# Patient Record
Sex: MALE | Race: WHITE | NOT HISPANIC OR LATINO | URBAN - METROPOLITAN AREA
[De-identification: names, ages, dates, MRNs, and addresses within clinical notes are randomized per-mention and may not be internally consistent; named-entity substitution may affect disease eponyms.]

---

## 2017-10-21 ENCOUNTER — INPATIENT (INPATIENT)
Facility: HOSPITAL | Age: 57
LOS: 3 days | Discharge: HOME | End: 2017-10-25
Attending: INTERNAL MEDICINE | Admitting: INTERNAL MEDICINE

## 2017-10-21 DIAGNOSIS — K74.60 UNSPECIFIED CIRRHOSIS OF LIVER: ICD-10-CM

## 2017-10-21 DIAGNOSIS — B18.2 CHRONIC VIRAL HEPATITIS C: ICD-10-CM

## 2017-10-30 DIAGNOSIS — B18.2 CHRONIC VIRAL HEPATITIS C: ICD-10-CM

## 2017-10-30 DIAGNOSIS — K70.31 ALCOHOLIC CIRRHOSIS OF LIVER WITH ASCITES: ICD-10-CM

## 2017-10-30 DIAGNOSIS — Z91.14 PATIENT'S OTHER NONCOMPLIANCE WITH MEDICATION REGIMEN: ICD-10-CM

## 2017-10-30 DIAGNOSIS — E87.5 HYPERKALEMIA: ICD-10-CM

## 2017-10-30 DIAGNOSIS — Z96.653 PRESENCE OF ARTIFICIAL KNEE JOINT, BILATERAL: ICD-10-CM

## 2017-10-30 DIAGNOSIS — E87.1 HYPO-OSMOLALITY AND HYPONATREMIA: ICD-10-CM

## 2017-10-30 DIAGNOSIS — F10.10 ALCOHOL ABUSE, UNCOMPLICATED: ICD-10-CM

## 2017-10-30 DIAGNOSIS — N49.2 INFLAMMATORY DISORDERS OF SCROTUM: ICD-10-CM

## 2017-10-30 DIAGNOSIS — F11.11 OPIOID ABUSE, IN REMISSION: ICD-10-CM

## 2017-10-30 DIAGNOSIS — Z88.0 ALLERGY STATUS TO PENICILLIN: ICD-10-CM

## 2017-10-30 DIAGNOSIS — E87.79 OTHER FLUID OVERLOAD: ICD-10-CM

## 2017-10-30 DIAGNOSIS — G89.4 CHRONIC PAIN SYNDROME: ICD-10-CM

## 2017-10-30 DIAGNOSIS — K70.40 ALCOHOLIC HEPATIC FAILURE WITHOUT COMA: ICD-10-CM

## 2018-12-02 ENCOUNTER — INPATIENT (INPATIENT)
Facility: HOSPITAL | Age: 58
LOS: 3 days | Discharge: ANOTHER HOSPITAL | End: 2018-12-06
Attending: INTERNAL MEDICINE | Admitting: INTERNAL MEDICINE
Payer: MEDICAID

## 2018-12-02 VITALS
DIASTOLIC BLOOD PRESSURE: 103 MMHG | OXYGEN SATURATION: 100 % | HEART RATE: 98 BPM | SYSTOLIC BLOOD PRESSURE: 119 MMHG | RESPIRATION RATE: 24 BRPM

## 2018-12-02 LAB
ALBUMIN SERPL ELPH-MCNC: 1.6 G/DL — LOW (ref 3.5–5.2)
ALLERGY+IMMUNOLOGY DIAG STUDY NOTE: SIGNIFICANT CHANGE UP
ALP SERPL-CCNC: 103 U/L — SIGNIFICANT CHANGE UP (ref 30–115)
ALT FLD-CCNC: 40 U/L — SIGNIFICANT CHANGE UP (ref 0–41)
AMMONIA BLD-MCNC: 88 UMOL/L — HIGH (ref 11–55)
ANION GAP SERPL CALC-SCNC: 14 MMOL/L — SIGNIFICANT CHANGE UP (ref 7–14)
ANION GAP SERPL CALC-SCNC: 19 MMOL/L — HIGH (ref 7–14)
APAP SERPL-MCNC: <5 UG/ML — LOW (ref 10–30)
AST SERPL-CCNC: 138 U/L — HIGH (ref 0–41)
BASE EXCESS BLDV CALC-SCNC: -4.3 MMOL/L — LOW (ref -2–2)
BASOPHILS # BLD AUTO: 0 K/UL — SIGNIFICANT CHANGE UP (ref 0–0.2)
BASOPHILS NFR BLD AUTO: 0 % — SIGNIFICANT CHANGE UP (ref 0–1)
BILIRUB SERPL-MCNC: 6.1 MG/DL — HIGH (ref 0.2–1.2)
BUN SERPL-MCNC: 33 MG/DL — HIGH (ref 10–20)
BUN SERPL-MCNC: 35 MG/DL — HIGH (ref 10–20)
CA-I SERPL-SCNC: 0.96 MMOL/L — LOW (ref 1.12–1.3)
CALCIUM SERPL-MCNC: 6.9 MG/DL — LOW (ref 8.5–10.1)
CALCIUM SERPL-MCNC: 7.4 MG/DL — LOW (ref 8.5–10.1)
CHLORIDE SERPL-SCNC: 84 MMOL/L — LOW (ref 98–110)
CHLORIDE SERPL-SCNC: 86 MMOL/L — LOW (ref 98–110)
CK MB CFR SERPL CALC: 26.1 NG/ML — HIGH (ref 0.6–6.3)
CK SERPL-CCNC: 1132 U/L — HIGH (ref 0–225)
CO2 SERPL-SCNC: 18 MMOL/L — SIGNIFICANT CHANGE UP (ref 17–32)
CO2 SERPL-SCNC: 20 MMOL/L — SIGNIFICANT CHANGE UP (ref 17–32)
CREAT SERPL-MCNC: 3.3 MG/DL — HIGH (ref 0.7–1.5)
CREAT SERPL-MCNC: 3.4 MG/DL — HIGH (ref 0.7–1.5)
EOSINOPHIL # BLD AUTO: 0.01 K/UL — SIGNIFICANT CHANGE UP (ref 0–0.7)
EOSINOPHIL NFR BLD AUTO: 0.1 % — SIGNIFICANT CHANGE UP (ref 0–8)
ETHANOL SERPL-MCNC: <10 MG/DL — HIGH
GAS PNL BLDV: 121 MMOL/L — LOW (ref 136–145)
GAS PNL BLDV: SIGNIFICANT CHANGE UP
GLUCOSE BLDC GLUCOMTR-MCNC: 100 MG/DL — HIGH (ref 70–99)
GLUCOSE SERPL-MCNC: 109 MG/DL — HIGH (ref 70–99)
GLUCOSE SERPL-MCNC: 45 MG/DL — CRITICAL LOW (ref 70–99)
HCO3 BLDV-SCNC: 21 MMOL/L — LOW (ref 22–29)
HCT VFR BLD CALC: 28.5 % — LOW (ref 42–52)
HCT VFR BLDA CALC: 45.4 % — HIGH (ref 34–44)
HGB BLD CALC-MCNC: 14.8 G/DL — SIGNIFICANT CHANGE UP (ref 14–18)
HGB BLD-MCNC: 9.5 G/DL — LOW (ref 14–18)
HOROWITZ INDEX BLDV+IHG-RTO: 21 — SIGNIFICANT CHANGE UP
IMM GRANULOCYTES NFR BLD AUTO: 0.9 % — HIGH (ref 0.1–0.3)
INR BLD: 2.93 RATIO — HIGH (ref 0.65–1.3)
LACTATE BLDV-MCNC: 5.4 MMOL/L — HIGH (ref 0.5–1.6)
LIDOCAIN IGE QN: 32 U/L — SIGNIFICANT CHANGE UP (ref 7–60)
LYMPHOCYTES # BLD AUTO: 0.54 K/UL — LOW (ref 1.2–3.4)
LYMPHOCYTES # BLD AUTO: 4.5 % — LOW (ref 20.5–51.1)
MAGNESIUM SERPL-MCNC: 1.8 MG/DL — SIGNIFICANT CHANGE UP (ref 1.8–2.4)
MCHC RBC-ENTMCNC: 31.9 PG — HIGH (ref 27–31)
MCHC RBC-ENTMCNC: 33.3 G/DL — SIGNIFICANT CHANGE UP (ref 32–37)
MCV RBC AUTO: 95.6 FL — HIGH (ref 80–94)
MONOCYTES # BLD AUTO: 0.97 K/UL — HIGH (ref 0.1–0.6)
MONOCYTES NFR BLD AUTO: 8 % — SIGNIFICANT CHANGE UP (ref 1.7–9.3)
NEUTROPHILS # BLD AUTO: 10.49 K/UL — HIGH (ref 1.4–6.5)
NEUTROPHILS NFR BLD AUTO: 86.5 % — HIGH (ref 42.2–75.2)
NRBC # BLD: 0 /100 WBCS — SIGNIFICANT CHANGE UP (ref 0–0)
PCO2 BLDV: 40 MMHG — LOW (ref 41–51)
PH BLDV: 7.33 — SIGNIFICANT CHANGE UP (ref 7.26–7.43)
PLATELET # BLD AUTO: 158 K/UL — SIGNIFICANT CHANGE UP (ref 130–400)
PO2 BLDV: 43 MMHG — HIGH (ref 20–40)
POTASSIUM BLDV-SCNC: 4.4 MMOL/L — SIGNIFICANT CHANGE UP (ref 3.3–5.6)
POTASSIUM SERPL-MCNC: 4.3 MMOL/L — SIGNIFICANT CHANGE UP (ref 3.5–5)
POTASSIUM SERPL-MCNC: 5 MMOL/L — SIGNIFICANT CHANGE UP (ref 3.5–5)
POTASSIUM SERPL-SCNC: 4.3 MMOL/L — SIGNIFICANT CHANGE UP (ref 3.5–5)
POTASSIUM SERPL-SCNC: 5 MMOL/L — SIGNIFICANT CHANGE UP (ref 3.5–5)
PROT SERPL-MCNC: 8.3 G/DL — HIGH (ref 6–8)
PROTHROM AB SERPL-ACNC: 32.3 SEC — HIGH (ref 9.95–12.87)
RBC # BLD: 2.98 M/UL — LOW (ref 4.7–6.1)
RBC # FLD: 17.2 % — HIGH (ref 11.5–14.5)
SALICYLATES SERPL-MCNC: <0.3 MG/DL — LOW (ref 4–30)
SAO2 % BLDV: 67 % — SIGNIFICANT CHANGE UP
SODIUM SERPL-SCNC: 120 MMOL/L — LOW (ref 135–146)
SODIUM SERPL-SCNC: 121 MMOL/L — LOW (ref 135–146)
TROPONIN T SERPL-MCNC: <0.01 NG/ML — SIGNIFICANT CHANGE UP
TYPE + AB SCN PNL BLD: SIGNIFICANT CHANGE UP
WBC # BLD: 12.12 K/UL — HIGH (ref 4.8–10.8)
WBC # FLD AUTO: 12.12 K/UL — HIGH (ref 4.8–10.8)

## 2018-12-02 RX ORDER — DEXTROSE 50 % IN WATER 50 %
100 SYRINGE (ML) INTRAVENOUS ONCE
Qty: 0 | Refills: 0 | Status: COMPLETED | OUTPATIENT
Start: 2018-12-02 | End: 2018-12-02

## 2018-12-02 RX ORDER — SODIUM CHLORIDE 9 MG/ML
1000 INJECTION INTRAMUSCULAR; INTRAVENOUS; SUBCUTANEOUS
Qty: 0 | Refills: 0 | Status: DISCONTINUED | OUTPATIENT
Start: 2018-12-02 | End: 2018-12-03

## 2018-12-02 RX ORDER — SODIUM CHLORIDE 9 MG/ML
1000 INJECTION INTRAMUSCULAR; INTRAVENOUS; SUBCUTANEOUS ONCE
Qty: 0 | Refills: 0 | Status: COMPLETED | OUTPATIENT
Start: 2018-12-02 | End: 2018-12-02

## 2018-12-02 RX ORDER — ALBUMIN HUMAN 25 %
50 VIAL (ML) INTRAVENOUS ONCE
Qty: 0 | Refills: 0 | Status: COMPLETED | OUTPATIENT
Start: 2018-12-02 | End: 2018-12-02

## 2018-12-02 RX ORDER — SODIUM CHLORIDE 9 MG/ML
1000 INJECTION, SOLUTION INTRAVENOUS
Qty: 0 | Refills: 0 | Status: DISCONTINUED | OUTPATIENT
Start: 2018-12-02 | End: 2018-12-03

## 2018-12-02 RX ORDER — PHYTONADIONE (VIT K1) 5 MG
2.5 TABLET ORAL ONCE
Qty: 0 | Refills: 0 | Status: COMPLETED | OUTPATIENT
Start: 2018-12-02 | End: 2018-12-02

## 2018-12-02 RX ORDER — SODIUM CHLORIDE 9 MG/ML
1000 INJECTION INTRAMUSCULAR; INTRAVENOUS; SUBCUTANEOUS
Qty: 0 | Refills: 0 | Status: DISCONTINUED | OUTPATIENT
Start: 2018-12-02 | End: 2018-12-02

## 2018-12-02 RX ORDER — ALBUMIN HUMAN 25 %
150 VIAL (ML) INTRAVENOUS ONCE
Qty: 0 | Refills: 0 | Status: DISCONTINUED | OUTPATIENT
Start: 2018-12-02 | End: 2018-12-02

## 2018-12-02 RX ORDER — CEFTRIAXONE 500 MG/1
2 INJECTION, POWDER, FOR SOLUTION INTRAMUSCULAR; INTRAVENOUS ONCE
Qty: 0 | Refills: 0 | Status: COMPLETED | OUTPATIENT
Start: 2018-12-02 | End: 2018-12-02

## 2018-12-02 RX ORDER — CEFTRIAXONE 500 MG/1
2 INJECTION, POWDER, FOR SOLUTION INTRAMUSCULAR; INTRAVENOUS EVERY 24 HOURS
Qty: 0 | Refills: 0 | Status: DISCONTINUED | OUTPATIENT
Start: 2018-12-03 | End: 2018-12-04

## 2018-12-02 RX ORDER — NOREPINEPHRINE BITARTRATE/D5W 8 MG/250ML
0.05 PLASTIC BAG, INJECTION (ML) INTRAVENOUS
Qty: 16 | Refills: 0 | Status: DISCONTINUED | OUTPATIENT
Start: 2018-12-02 | End: 2018-12-04

## 2018-12-02 RX ORDER — HYDROCORTISONE 20 MG
100 TABLET ORAL EVERY 6 HOURS
Qty: 0 | Refills: 0 | Status: DISCONTINUED | OUTPATIENT
Start: 2018-12-02 | End: 2018-12-04

## 2018-12-02 RX ORDER — PIPERACILLIN AND TAZOBACTAM 4; .5 G/20ML; G/20ML
4.5 INJECTION, POWDER, LYOPHILIZED, FOR SOLUTION INTRAVENOUS ONCE
Qty: 0 | Refills: 0 | Status: COMPLETED | OUTPATIENT
Start: 2018-12-02 | End: 2018-12-02

## 2018-12-02 RX ORDER — SODIUM CHLORIDE 9 MG/ML
1000 INJECTION, SOLUTION INTRAVENOUS
Qty: 0 | Refills: 0 | Status: DISCONTINUED | OUTPATIENT
Start: 2018-12-02 | End: 2018-12-02

## 2018-12-02 RX ORDER — DEXTROSE 50 % IN WATER 50 %
50 SYRINGE (ML) INTRAVENOUS
Qty: 0 | Refills: 0 | Status: DISCONTINUED | OUTPATIENT
Start: 2018-12-02 | End: 2018-12-06

## 2018-12-02 RX ORDER — ALBUMIN HUMAN 25 %
50 VIAL (ML) INTRAVENOUS ONCE
Qty: 0 | Refills: 0 | Status: DISCONTINUED | OUTPATIENT
Start: 2018-12-02 | End: 2018-12-02

## 2018-12-02 RX ORDER — CEFTRIAXONE 500 MG/1
INJECTION, POWDER, FOR SOLUTION INTRAMUSCULAR; INTRAVENOUS
Qty: 0 | Refills: 0 | Status: DISCONTINUED | OUTPATIENT
Start: 2018-12-02 | End: 2018-12-04

## 2018-12-02 RX ORDER — PHYTONADIONE (VIT K1) 5 MG
10 TABLET ORAL ONCE
Qty: 0 | Refills: 0 | Status: DISCONTINUED | OUTPATIENT
Start: 2018-12-02 | End: 2018-12-02

## 2018-12-02 RX ORDER — DEXTROSE 50 % IN WATER 50 %
50 SYRINGE (ML) INTRAVENOUS ONCE
Qty: 0 | Refills: 0 | Status: COMPLETED | OUTPATIENT
Start: 2018-12-02 | End: 2018-12-02

## 2018-12-02 RX ADMIN — Medication 50 MILLILITER(S): at 14:20

## 2018-12-02 RX ADMIN — CEFTRIAXONE 100 GRAM(S): 500 INJECTION, POWDER, FOR SOLUTION INTRAMUSCULAR; INTRAVENOUS at 19:47

## 2018-12-02 RX ADMIN — Medication 100 MILLIGRAM(S): at 22:11

## 2018-12-02 RX ADMIN — Medication 66.67 GRAM(S): at 23:45

## 2018-12-02 RX ADMIN — Medication 6.58 MICROGRAM(S)/KG/MIN: at 23:44

## 2018-12-02 RX ADMIN — SODIUM CHLORIDE 100 MILLILITER(S): 9 INJECTION, SOLUTION INTRAVENOUS at 22:25

## 2018-12-02 RX ADMIN — Medication 100 MILLILITER(S): at 12:15

## 2018-12-02 RX ADMIN — Medication 100.5 MILLIGRAM(S): at 23:13

## 2018-12-02 RX ADMIN — SODIUM CHLORIDE 100 MILLILITER(S): 9 INJECTION, SOLUTION INTRAVENOUS at 16:02

## 2018-12-02 RX ADMIN — PIPERACILLIN AND TAZOBACTAM 200 GRAM(S): 4; .5 INJECTION, POWDER, LYOPHILIZED, FOR SOLUTION INTRAVENOUS at 20:11

## 2018-12-02 RX ADMIN — CEFTRIAXONE 2 GRAM(S): 500 INJECTION, POWDER, FOR SOLUTION INTRAMUSCULAR; INTRAVENOUS at 20:15

## 2018-12-02 RX ADMIN — SODIUM CHLORIDE 1000 MILLILITER(S): 9 INJECTION INTRAMUSCULAR; INTRAVENOUS; SUBCUTANEOUS at 21:16

## 2018-12-02 RX ADMIN — SODIUM CHLORIDE 1000 MILLILITER(S): 9 INJECTION INTRAMUSCULAR; INTRAVENOUS; SUBCUTANEOUS at 12:20

## 2018-12-02 RX ADMIN — SODIUM CHLORIDE 100 MILLILITER(S): 9 INJECTION INTRAMUSCULAR; INTRAVENOUS; SUBCUTANEOUS at 14:21

## 2018-12-02 NOTE — ED PROVIDER NOTE - CRITICAL CARE PROVIDED
interpretation of diagnostic studies/consultation with other physicians/documentation/additional history taking/85/direct patient care (not related to procedure)/consult w/ pt's family directly relating to pts condition

## 2018-12-02 NOTE — ED PROVIDER NOTE - PROGRESS NOTE DETAILS
BG low, difficult access. access just obtained via US guided IV after 3 attempts. given 2 amps of D50. BG low, difficult access. access just obtained via US guided IV after 3 attempts. given 2 amps of D50. pt mentating. pending CT. ICU requests diagnostic paracentesis,. michael, brother -> [phone]; fidencio, friend -> [phone] due to elevated INR will not paracentese in ED. placed central line at bedside due to lack of access and pts clinical condition. paged Dr. cronin, accepted by ICU to unit. michael, brother -> [phone]; fidencio, friend -> [phone] have no information to contribute to history antolin- Patient presents with altered mental status unsure of eitology, was found by ems altered  with fecal material on b/l lower extremities, found hypotensive, hypoglycemic and hypothermic, after initial intervention of iv access, monitoring, patient improved as far as bp, hr, and glucose, I am unsure of cause of ams but with his history fo hep c and liver disease this is more likely related to a metabolic cause, specifically probable hepatic encephalopathy vs hepatorenal syndrome, this is less likely related to sepsis/infection, I will consult ICU and follow reccomendations.  Antiboiotics held at this time more likely metabolic in nature rather than infection

## 2018-12-02 NOTE — ED ADULT NURSE REASSESSMENT NOTE - NS ED NURSE REASSESS COMMENT FT1
Solu-cortef, Vit K, and Albumin were called for at the pharmacy by this writer, transport just arrived to take pt, and report is currently being given by sandra FLETCHER RN, at this time, endorsed all 3 meds that need to be given to the RN on line with YOU Conley. Pt is being sent to the Unit, called ultrasound, and explained that pt is a critical pt, and he needs to be in unit asap, ultrasound to be done after pt is on unit. INR is 2.93.

## 2018-12-02 NOTE — ED PROVIDER NOTE - ATTENDING CONTRIBUTION TO CARE
I personally evaluated the patient. I reviewed the Physician Assistant’s note (as assigned above), and agree with the findings and plan except as documented in my note. 59yo M BIBA from home for AMS. On arrival patient FS is 38. Pt is unable to provide any history at this time. PE: altered, PERRL, OP dry, radial pulses 2 + equal and pedal able to move all 4 ext. gen edema trunk, perineum and legs. Found to be hypothermic, large bore IV access obtained, given amp of D-50. IV fluid bolus initiated, placed on warmer. Plan: obtain labs, CT head, CXR, continue to monitor And admission

## 2018-12-02 NOTE — ED PROVIDER NOTE - CARE PLAN
Principal Discharge DX:	Altered mental status  Secondary Diagnosis:	Anasarca  Secondary Diagnosis:	Liver failure  Secondary Diagnosis:	Anemia  Secondary Diagnosis:	Ascites  Secondary Diagnosis:	Cirrhosis  Secondary Diagnosis:	Hypoglycemia

## 2018-12-02 NOTE — ED PROCEDURE NOTE - CPROC ED INFUS LINE DETAIL1
The location was identified, and the area was draped and prepped./The catheter was placed using sterile technique.
The location was identified, and the area was draped and prepped./The catheter was placed using sterile technique./All lumen(s) aspirated and flushed without difficulty./The guidewire was recovered./Ultrasound guidance was used during placement.

## 2018-12-02 NOTE — CONSULT NOTE ADULT - ASSESSMENT
#AMS ?etiology, hepatic encephalopathy vs infectious vs hypoglycemia vs hyponatremia vs drug  -check ammonia  -change fluids to D5NS  -ct head results above  -check UDS/ SDS  -pan culture    #diffuse abdominal ascites with pain to palpation  - diagnostic para- r/o SBP  - CT abd/pelvis r/o incarcerated bowel- patient has large hernia and pain to palpation of hernia    #LORENZO likely prerenal   check feNA and Fe Urea (patient was d/haritha with lasix in october, unsure of compliance)  check UA/microscopic UA  -if no improvement with IVF consider albumin for hepatorenal syndrome + octreotide and midodrine (with goal of increasing map by 10mmHG)    #hyponatremia likely 2/2 cirrhosis (hypervolemic/anasarca  check urine Na, urine osm  -monitor Sna  - avoid overcorrection    Dispo to be determined #Septic shock 2/2 ?SBP vs other etiology  - admit to micu  -levophed  -paracentesis not done due to elevated INR  - pan culture  - albumin 1.5g/kg x1 and on day 3   -iv vitamin K 10mg  -hydrocortisone 100mg q6h    AMS ?etiology, hepatic encephalopathy vs infectious vs hypoglycemia vs hyponatremia vs drug  -check ammonia  -monitor FS q2h, if low D50 push  -ct head results above  -check UDS/ SDS  -pan culture    # large abdominal ascites with pain to palpation  - diagnostic para not done due to elevated INR  - give albumin 1.5g/kg  -start   - CT abd/pelvis r/o incarcerated bowel- patient has large hernia and pain to palpation of hernia    #LORENZO likely prerenal   check feNA and Fe Urea (patient was d/haritha with lasix in october, unsure of compliance)  check UA/microscopic UA  -if no improvement with IVF consider adding octreotide    #hyponatremia likely 2/2 cirrhosis (hypervolemic/anasarca)  -free water restriction  check urine Na, urine osm  -monitor Sna  -IV NS  - avoid overcorrection #Septic shock 2/2 ?SBP vs other etiology  - admit to micu  -levophed  -paracentesis not done due to elevated INR  - pan culture  - albumin 150g x1 and on day 3 give 100g  -iv vitamin K 10mg  -hydrocortisone 100mg q6h    AMS ?etiology, hepatic encephalopathy vs infectious vs hypoglycemia vs hyponatremia vs drug  -check ammonia  -monitor FS q2h, if low D50 push  -ct head results above  -check UDS/ SDS  -pan culture    # large abdominal ascites with pain to palpation  - diagnostic para not done due to elevated INR  - give albumin 1.5g/kg  -start rocephin, will treat as SBP for now  - CT abd/pelvis r/o incarcerated bowel- patient has large hernia and pain to palpation of hernia    #LORENZO likely prerenal   check feNA and Fe Urea (patient was d/haritha with lasix in october, unsure of compliance)  check UA/microscopic UA  -if no improvement with IVF consider adding octreotide    #hyponatremia likely 2/2 cirrhosis (hypervolemic/anasarca)  -free water restriction  check urine Na, urine osm  -monitor Sna  -IV NS  - avoid overcorrection

## 2018-12-02 NOTE — ED ADULT NURSE REASSESSMENT NOTE - NS ED NURSE REASSESS COMMENT FT1
Attempted zavala insertion with PA and RNs, unsuccessful @ 1400.    IV not functioning @ 1700, JO ANN Green aware.    Pt with no urine output @1700, JO ANN green

## 2018-12-02 NOTE — ED PROVIDER NOTE - OBJECTIVE STATEMENT
59 y/o M BIBA for AMS. FS 38. pt moaning, unable to provide hx. denies medical problems. +hx ETOH abuse per EMR. 57 y/o M BIBA for AMS. FS 38. pt moaning, unable to provide hx. denies medical problems. +hx ETOH abuse per EMR. +hx of alcoholic cirrhosis per EMR.

## 2018-12-02 NOTE — ED PROVIDER NOTE - MEDICAL DECISION MAKING DETAILS
I personally evaluated the patient. I reviewed the Physician Assistant’s note (as assigned above), and agree with the findings and plan except as documented in my note. 57yo M BIBA from home for AMS. On arrival patient FS is 38. Pt is unable to provide any history at this time. PE: altered, PERRL, OP dry, radial pulses 2 + equal and pedal able to move all 4 ext. gen edema trunk, perineum and legs. Found to be hypothermic, large bore IV access obtained, given amp of D-50. IV fluid bolus initiated, placed on warmer. Plan: obtain labs, CT head, CXR, continue to monitor And admission

## 2018-12-02 NOTE — ED PROVIDER NOTE - PHYSICAL EXAMINATION
PHYSICAL EXAM:    GENERAL: alert, altered   SKIN: dry  HEAD: NC, AT, no step offs   EYE: Normal lids/conjunctiva  ENT: Normal hearing, patent oropharynx  NECK: +supple. No meningismus, or JVD, +Trachea midline. no tenderness/step offs.   Pulm: Bilateral BS, normal resp effort, no wheezes, stridor, or retractions  CV: RRR, no M/R/G, 2+and = radial pulses  Abd: soft, non-tender, + abdominal distention v obesity   Mskel: no erythema, cyanosis, edema. no calf tenderness. moving all extremities   Neuro: alert, unable to follow commands. moaning PHYSICAL EXAM:    GENERAL: alert, altered   SKIN: dry  HEAD: NC, AT, no step offs   EYE: Normal lids/conjunctiva  ENT: Normal hearing, patent oropharynx  NECK: +supple. No meningismus, or JVD, +Trachea midline. no tenderness/step offs.   Pulm: Bilateral BS, normal resp effort, no wheezes, stridor, or retractions  CV: RRR, no M/R/G, 2+and = radial pulses  Abd: soft, non-tender, +generalized anasarca. +ascites   Mskel: no erythema, cyanosis, edema. no calf tenderness. moving all extremities   Neuro: alert, unable to follow commands. moaning PHYSICAL EXAM:    GENERAL: altered mental status able to say nonsensical words   SKIN: dry  HEAD: NC, AT, no step offs   EYE: Normal lids/conjunctiva  ENT: Normal hearing, patent oropharynx  NECK: +supple. No meningismus, or JVD, +Trachea midline. no tenderness/step offs.   Pulm: Bilateral BS, normal resp effort, no wheezes, stridor, or retractions  CV: RRR, no M/R/G, 2+and = radial pulses  Abd: soft, non-tender, +generalized anasarca. +ascites   Mskel: no erythema, cyanosis, edema. no calf tenderness. moving all extremities   Neuro: alert, unable to follow commands. moaning

## 2018-12-02 NOTE — ED ADULT NURSE REASSESSMENT NOTE - NS ED NURSE REASSESS COMMENT FT1
Received pt ay [id number], both MD and PA at bedside, unable to obtain IV access, Fitzpatrick was place, no urine output is present at this time.

## 2018-12-02 NOTE — ED ADULT NURSE NOTE - NSIMPLEMENTINTERV_GEN_ALL_ED
Implemented All Fall with Harm Risk Interventions:  [place] to call system. Call bell, personal items and telephone within reach. Instruct patient to call for assistance. Room bathroom lighting operational. Non-slip footwear when patient is off stretcher. Physically safe environment: no spills, clutter or unnecessary equipment. Stretcher in lowest position, wheels locked, appropriate side rails in place. Provide visual cue, wrist band, yellow gown, etc. Monitor gait and stability. Monitor for mental status changes and reorient to person, place, and time. Review medications for side effects contributing to fall risk. Reinforce activity limits and safety measures with patient and family. Provide visual clues: red socks.

## 2018-12-02 NOTE — CONSULT NOTE ADULT - SUBJECTIVE AND OBJECTIVE BOX
Patient is a 58y old  Male who presents with a chief complaint of altered mental status    HPI:  57 yo male with H/o Hep C, Cirrhosis 2/2 etoh abus and hep c, heroin abuse, chronic back pain presents to the ED with Altered mental status. History obtained via chart review and per ED signout. Patient was found by his neighbors [place] [address] [place] his home covered in feces. BIBA and FS found to be 38 and patient hypotensive with BP 84/50, and hypothermic (93.9F rectal temp). patient during time of examination lethargic and not answering questions appropriately or following commands (ex squeeze my hand, or lift arms up),. AOx1 (oriented to self only).    per chart review patient was admited to [facility] oct 2017 with tense abdomen 2/2 ascites, had a large volume paracentesis done and was discharged home on lasix and spironolactone.     PAST MEDICAL & SURGICAL HISTORY:  hep c  cirrhosis  etoh abuse  heroin abuse  chronic back pain  arthritis    SOCIAL HX:   Smoking                         ETOH                            Other    FAMILY HISTORY:      Review of system:  See HPI    Allergies    No Known Allergies    Intolerances          PHYSICAL EXAM  HEENT- sceral icteris  cardio- RRR S1/S2  abdomen- diffuce ascites with pitting kannan aof abdomen, TTP throughout, large ventral hernia     ICU Vital Signs Last 24 Hrs  T(C): 35 (02 Dec 2018 16:12), Max: 35 (02 Dec 2018 15:22)  T(F): 95 (02 Dec 2018 16:12), Max: 95 (02 Dec 2018 15:22)  HR: 82 (02 Dec 2018 16:12) (68 - 98)  BP: 120/57 (02 Dec 2018 16:12) (74/51 - 120/57)  BP(mean): 71 (02 Dec 2018 12:35) (68 - 71)  ABP: --  ABP(mean): --  RR: 20 (02 Dec 2018 16:12) (20 - 24)  SpO2: 97% (02 Dec 2018 16:12) (96% - 100%)    I&O's Detail          LABS:                          9.5    12.12 )-----------( 158      ( 02 Dec 2018 12:46 )             28.5                                               12-02    121<L>  |  84<L>  |  33<H>  ----------------------------<  45<LL>  5.0   |  18  |  3.4<H>    Ca    7.4<L>      02 Dec 2018 12:24  Mg     1.8     12-02    TPro  8.3<H>  /  Alb  1.6<L>  /  TBili  6.1<H>  /  DBili  x   /  AST  138<H>  /  ALT  40  /  AlkPhos  103  12-02                                                 CARDIAC MARKERS ( 02 Dec 2018 12:24 )  x     / <0.01 ng/mL / 1132 U/L / x     / 26.1 ng/mL                                            LIVER FUNCTIONS - ( 02 Dec 2018 12:24 )  Alb: 1.6 g/dL / Pro: 8.3 g/dL / ALK PHOS: 103 U/L / ALT: 40 U/L / AST: 138 U/L / GGT: x                                                    Lactate (12-02-18 @ 12:50): 5.4<H>                                                                                               MEDICATIONS  (STANDING):  dextrose 5%. 1000 milliLiter(s) (100 mL/Hr) IV Continuous <Continuous>  sodium chloride 0.9%. 1000 milliLiter(s) (100 mL/Hr) IV Continuous <Continuous>    MEDICATIONS  (PRN):      Radiology:  < from: CT Head No Cont (12.02.18 @ 13:35) >  1.  Slightly limited study due to patient motion and motion artifact.     2.  Mild periventricular and subcortical white matter chronic small   vessel ischemic changes.    3.  Old lacunar infarct versus prominent sulcus or neuroepithelial cyst   medial left occipital lobe.    4.  Left frontal and anterior ethmoid sinus disease.    5.  No acute mass effect, midline shift or hemorrhage.      < end of copied text > Patient is a 58y old  Male who presents with a chief complaint of altered mental status    HPI:  57 yo male with H/o Hep C, Cirrhosis 2/2 etoh abus and hep c, heroin abuse, chronic back pain presents to the ED with Altered mental status. History obtained via chart review and per ED signout. Patient was found by his neighbors [place] [address] [place] his home covered in feces. BIBA and FS found to be 38 and patient hypotensive with BP 84/50, and hypothermic (93.9F rectal temp). patient during time of examination lethargic and not answering questions appropriately or following commands (ex squeeze my hand, or lift arms up),. AOx1 (oriented to self only).    per chart review patient was admited to [facility] oct 2017 with tense abdomen 2/2 ascites, had a large volume paracentesis done and was discharged home on lasix and spironolactone.     PAST MEDICAL & SURGICAL HISTORY:  hep c  cirrhosis  etoh abuse  heroin abuse  chronic back pain  arthritis    SOCIAL HX:   Smoking                         ETOH      patient unable to answer when his last drink was                      Other h/o heroin abuse    FAMILY HISTORY:      Review of system:  See HPI    Allergies    No Known Allergies    Intolerances          PHYSICAL EXAM:  GENERAL:  no signs of respiratory distress, lethargic  HEAD:  Atraumatic, Normocephalic  EYES: EOMI, scleral icterus  NECK: Supple, No JVD  CHEST/LUNG: Clear to auscultation bilaterally; No wheeze; No crackles; No accessory muscles used  HEART: Regular rate and rhythm; No murmurs;   ABDOMEN:  distended; TTP throughout, large ventral hernia  EXTREMITIES:  3+ pitting edema  PSYCH: AAOx1  NEUROLOGY: non-focal  skin- jaundiced    ICU Vital Signs Last 24 Hrs  T(C): 35 (02 Dec 2018 16:12), Max: 35 (02 Dec 2018 15:22)  T(F): 95 (02 Dec 2018 16:12), Max: 95 (02 Dec 2018 15:22)  HR: 82 (02 Dec 2018 16:12) (68 - 98)  BP: 120/57 (02 Dec 2018 16:12) (74/51 - 120/57)  BP(mean): 71 (02 Dec 2018 12:35) (68 - 71)  ABP: --  ABP(mean): --  RR: 20 (02 Dec 2018 16:12) (20 - 24)  SpO2: 97% (02 Dec 2018 16:12) (96% - 100%)    I&O's Detail          LABS:                          9.5    12.12 )-----------( 158      ( 02 Dec 2018 12:46 )             28.5                                               12-02    121<L>  |  84<L>  |  33<H>  ----------------------------<  45<LL>  5.0   |  18  |  3.4<H>    Ca    7.4<L>      02 Dec 2018 12:24  Mg     1.8     12-02    TPro  8.3<H>  /  Alb  1.6<L>  /  TBili  6.1<H>  /  DBili  x   /  AST  138<H>  /  ALT  40  /  AlkPhos  103  12-02                                                 CARDIAC MARKERS ( 02 Dec 2018 12:24 )  x     / <0.01 ng/mL / 1132 U/L / x     / 26.1 ng/mL                                            LIVER FUNCTIONS - ( 02 Dec 2018 12:24 )  Alb: 1.6 g/dL / Pro: 8.3 g/dL / ALK PHOS: 103 U/L / ALT: 40 U/L / AST: 138 U/L / GGT: x                                                    Lactate (12-02-18 @ 12:50): 5.4<H>                                                                                               MEDICATIONS  (STANDING):  dextrose 5%. 1000 milliLiter(s) (100 mL/Hr) IV Continuous <Continuous>  sodium chloride 0.9%. 1000 milliLiter(s) (100 mL/Hr) IV Continuous <Continuous>    MEDICATIONS  (PRN):      Radiology:  < from: CT Head No Cont (12.02.18 @ 13:35) >  1.  Slightly limited study due to patient motion and motion artifact.     2.  Mild periventricular and subcortical white matter chronic small   vessel ischemic changes.    3.  Old lacunar infarct versus prominent sulcus or neuroepithelial cyst   medial left occipital lobe.    4.  Left frontal and anterior ethmoid sinus disease.    5.  No acute mass effect, midline shift or hemorrhage.      < end of copied text >

## 2018-12-03 LAB
-  STREPTOCOCCUS SP. (NOT GRP A, B OR S PNEUMONIAE): SIGNIFICANT CHANGE UP
ALBUMIN SERPL ELPH-MCNC: 2 G/DL — LOW (ref 3.5–5.2)
ALP SERPL-CCNC: 90 U/L — SIGNIFICANT CHANGE UP (ref 30–115)
ALT FLD-CCNC: 64 U/L — HIGH (ref 0–41)
AMMONIA BLD-MCNC: 50 UMOL/L — SIGNIFICANT CHANGE UP (ref 11–55)
ANION GAP SERPL CALC-SCNC: 15 MMOL/L — HIGH (ref 7–14)
APTT BLD: 54.2 SEC — HIGH (ref 27–39.2)
AST SERPL-CCNC: 306 U/L — HIGH (ref 0–41)
B PERT IGG+IGM PNL SER: CLEAR — SIGNIFICANT CHANGE UP
BASOPHILS # BLD AUTO: 0.01 K/UL — SIGNIFICANT CHANGE UP (ref 0–0.2)
BASOPHILS NFR BLD AUTO: 0.1 % — SIGNIFICANT CHANGE UP (ref 0–1)
BILIRUB DIRECT SERPL-MCNC: 4.2 MG/DL — HIGH (ref 0–0.2)
BILIRUB INDIRECT FLD-MCNC: 2 MG/DL — HIGH (ref 0.2–1.2)
BILIRUB SERPL-MCNC: 6.2 MG/DL — HIGH (ref 0.2–1.2)
BUN SERPL-MCNC: 36 MG/DL — HIGH (ref 10–20)
CALCIUM SERPL-MCNC: 7.1 MG/DL — LOW (ref 8.5–10.1)
CHLORIDE SERPL-SCNC: 85 MMOL/L — LOW (ref 98–110)
CK MB CFR SERPL CALC: 78 NG/ML — HIGH (ref 0.6–6.3)
CK SERPL-CCNC: >2000 U/L — HIGH (ref 0–225)
CO2 SERPL-SCNC: 20 MMOL/L — SIGNIFICANT CHANGE UP (ref 17–32)
COLOR FLD: YELLOW — SIGNIFICANT CHANGE UP
CREAT SERPL-MCNC: 3.7 MG/DL — HIGH (ref 0.7–1.5)
EOSINOPHIL # BLD AUTO: 0.01 K/UL — SIGNIFICANT CHANGE UP (ref 0–0.7)
EOSINOPHIL NFR BLD AUTO: 0.1 % — SIGNIFICANT CHANGE UP (ref 0–8)
FLUID INTAKE SUBSTANCE CLASS: SIGNIFICANT CHANGE UP
FLUID SEGMENTED GRANULOCYTES: 74 % — SIGNIFICANT CHANGE UP
GLUCOSE BLDC GLUCOMTR-MCNC: 119 MG/DL — HIGH (ref 70–99)
GLUCOSE BLDC GLUCOMTR-MCNC: 132 MG/DL — HIGH (ref 70–99)
GLUCOSE BLDC GLUCOMTR-MCNC: 141 MG/DL — HIGH (ref 70–99)
GLUCOSE BLDC GLUCOMTR-MCNC: 157 MG/DL — HIGH (ref 70–99)
GLUCOSE SERPL-MCNC: 132 MG/DL — HIGH (ref 70–99)
GRAM STN FLD: SIGNIFICANT CHANGE UP
HCT VFR BLD CALC: 24 % — LOW (ref 42–52)
HGB BLD-MCNC: 8.2 G/DL — LOW (ref 14–18)
IMM GRANULOCYTES NFR BLD AUTO: 1 % — HIGH (ref 0.1–0.3)
INR BLD: 3.2 RATIO — HIGH (ref 0.65–1.3)
LIDOCAIN IGE QN: 41 U/L — SIGNIFICANT CHANGE UP (ref 7–60)
LYMPHOCYTES # BLD AUTO: 0.42 K/UL — LOW (ref 1.2–3.4)
LYMPHOCYTES # BLD AUTO: 3.7 % — LOW (ref 20.5–51.1)
LYMPHOCYTES # FLD: 13 % — SIGNIFICANT CHANGE UP
MAGNESIUM SERPL-MCNC: 1.7 MG/DL — LOW (ref 1.8–2.4)
MCHC RBC-ENTMCNC: 32.3 PG — HIGH (ref 27–31)
MCHC RBC-ENTMCNC: 34.2 G/DL — SIGNIFICANT CHANGE UP (ref 32–37)
MCV RBC AUTO: 94.5 FL — HIGH (ref 80–94)
MESOTHL CELL # FLD: SIGNIFICANT CHANGE UP %
METHOD TYPE: SIGNIFICANT CHANGE UP
MONOCYTES # BLD AUTO: 0.85 K/UL — HIGH (ref 0.1–0.6)
MONOCYTES NFR BLD AUTO: 7.5 % — SIGNIFICANT CHANGE UP (ref 1.7–9.3)
MONOS+MACROS # FLD: 13 % — SIGNIFICANT CHANGE UP
NEUTROPHILS # BLD AUTO: 9.95 K/UL — HIGH (ref 1.4–6.5)
NEUTROPHILS NFR BLD AUTO: 87.6 % — HIGH (ref 42.2–75.2)
PLATELET # BLD AUTO: 130 K/UL — SIGNIFICANT CHANGE UP (ref 130–400)
POTASSIUM SERPL-MCNC: 4.6 MMOL/L — SIGNIFICANT CHANGE UP (ref 3.5–5)
POTASSIUM SERPL-SCNC: 4.6 MMOL/L — SIGNIFICANT CHANGE UP (ref 3.5–5)
PROT SERPL-MCNC: 7.3 G/DL — SIGNIFICANT CHANGE UP (ref 6–8)
PROTHROM AB SERPL-ACNC: 35.4 SEC — HIGH (ref 9.95–12.87)
RBC # BLD: 2.54 M/UL — LOW (ref 4.7–6.1)
RBC # FLD: 17.1 % — HIGH (ref 11.5–14.5)
RCV VOL RI: 1000 /UL — HIGH (ref 0–5)
SODIUM SERPL-SCNC: 120 MMOL/L — LOW (ref 135–146)
SPECIMEN SOURCE: SIGNIFICANT CHANGE UP
TOTAL NUCLEATED CELL COUNT, BODY FLUID: 1172 /UL — HIGH (ref 0–5)
TROPONIN T SERPL-MCNC: <0.01 NG/ML — SIGNIFICANT CHANGE UP
TUBE TYPE: SIGNIFICANT CHANGE UP
WBC # BLD: 11.35 K/UL — HIGH (ref 4.8–10.8)
WBC # FLD AUTO: 11.35 K/UL — HIGH (ref 4.8–10.8)

## 2018-12-03 RX ORDER — LACTULOSE 10 G/15ML
20 SOLUTION ORAL THREE TIMES A DAY
Qty: 0 | Refills: 0 | Status: DISCONTINUED | OUTPATIENT
Start: 2018-12-03 | End: 2018-12-04

## 2018-12-03 RX ORDER — THIAMINE MONONITRATE (VIT B1) 100 MG
100 TABLET ORAL DAILY
Qty: 0 | Refills: 0 | Status: DISCONTINUED | OUTPATIENT
Start: 2018-12-03 | End: 2018-12-06

## 2018-12-03 RX ORDER — PANTOPRAZOLE SODIUM 20 MG/1
0 TABLET, DELAYED RELEASE ORAL
Qty: 30 | Refills: 0 | COMMUNITY

## 2018-12-03 RX ORDER — FOLIC ACID 0.8 MG
1 TABLET ORAL DAILY
Qty: 0 | Refills: 0 | Status: DISCONTINUED | OUTPATIENT
Start: 2018-12-03 | End: 2018-12-06

## 2018-12-03 RX ORDER — PHYTONADIONE (VIT K1) 5 MG
2.5 TABLET ORAL ONCE
Qty: 0 | Refills: 0 | Status: COMPLETED | OUTPATIENT
Start: 2018-12-03 | End: 2018-12-03

## 2018-12-03 RX ORDER — HALOPERIDOL DECANOATE 100 MG/ML
2.5 INJECTION INTRAMUSCULAR ONCE
Qty: 0 | Refills: 0 | Status: COMPLETED | OUTPATIENT
Start: 2018-12-03 | End: 2018-12-03

## 2018-12-03 RX ORDER — PANTOPRAZOLE SODIUM 20 MG/1
40 TABLET, DELAYED RELEASE ORAL
Qty: 0 | Refills: 0 | Status: DISCONTINUED | OUTPATIENT
Start: 2018-12-03 | End: 2018-12-06

## 2018-12-03 RX ADMIN — Medication 100 MILLIGRAM(S): at 05:57

## 2018-12-03 RX ADMIN — HALOPERIDOL DECANOATE 2.5 MILLIGRAM(S): 100 INJECTION INTRAMUSCULAR at 00:48

## 2018-12-03 RX ADMIN — Medication 100 MILLIGRAM(S): at 11:09

## 2018-12-03 RX ADMIN — SODIUM CHLORIDE 100 MILLILITER(S): 9 INJECTION, SOLUTION INTRAVENOUS at 07:35

## 2018-12-03 RX ADMIN — CEFTRIAXONE 100 GRAM(S): 500 INJECTION, POWDER, FOR SOLUTION INTRAMUSCULAR; INTRAVENOUS at 20:01

## 2018-12-03 RX ADMIN — Medication 100.5 MILLIGRAM(S): at 13:59

## 2018-12-03 RX ADMIN — Medication 100 MILLIGRAM(S): at 17:19

## 2018-12-03 RX ADMIN — Medication 100 MILLIGRAM(S): at 23:11

## 2018-12-03 RX ADMIN — Medication 1 MILLIGRAM(S): at 01:49

## 2018-12-03 RX ADMIN — Medication 6.58 MICROGRAM(S)/KG/MIN: at 07:36

## 2018-12-03 NOTE — CONSULT NOTE ADULT - ASSESSMENT
IMPRESSION:  AMS likely metabolic septic shock vs hyponatremia vs intoxication   LORENZO ? (Unsure of baseline)  Mild rhabdomyolysis   Liver cirrhosis       PLAN:    CNS: No sedatives; Neuro eval; EEG; Thiamine folate; Utox; EtOH level     HEENT: Oral care;     PULMONARY: HOB at 45 degrees; Aspiration precautions     CARDIOVASCULAR: Keep I=<O; D/C IVF; Taper levophed target MAP > 65;     GI: GI prophylaxis. GI eval; FU LFTs; Lactulose with target 1-2 BM /day; Diagnostic paracentesis to r/o SBP    RENAL: FU lytes; Renal eval; Trend CK; Fitzpatrick; Urine Na U Osm; Serum OSM    INFECTIOUS DISEASE: Panculture; Keep on Rocephin pending cultures    HEMATOLOGICAL:  DVT prophylaxis--> SCD;      ENDOCRINE:  Follow up FS.  Insulin protocol if needed.    MICU monitoring for now

## 2018-12-03 NOTE — PROGRESS NOTE ADULT - ASSESSMENT
Altered mental status  Sepsis?  Hepatorenal?  SBP?  encephalopathy  Liver Cirrhosis        Monitor in ICU  Renal eval  GI eval  ID  paracentesis, diagnostic and therapeutic  IV Abx

## 2018-12-03 NOTE — PROCEDURE NOTE - PROCEDURE
<<-----Click on this checkbox to enter Procedure Paracentesis at bedside  12/03/2018    Active  [id number]

## 2018-12-03 NOTE — H&P ADULT - NSHPLABSRESULTS_GEN_ALL_CORE
8.2    11.35 )-----------( 130      ( 03 Dec 2018 06:35 )             24.0     120<L>  |  85<L>  |  36<H>  ----------------------------<  132<H>  4.6   |  20  |  3.7<H>    Ca    7.1<L>      03 Dec 2018 06:35  Mg     1.7     12-03    TPro  7.3  /  Alb  2.0<L>  /  TBili  6.2<H>  /  DBili  4.2<H>  /  AST  306<H>  /  ALT  64<H>  /  AlkPhos  90  12-03    PT/INR - ( 03 Dec 2018 06:35 )   PT: 35.40 sec;   INR: 3.20 ratio    PTT - ( 03 Dec 2018 06:35 )  PTT:54.2 sec    CARDIAC MARKERS ( 03 Dec 2018 06:35 )  x     / <0.01 ng/mL / 6083 U/L / x     / 78.0 ng/mL  CARDIAC MARKERS ( 02 Dec 2018 12:24 )  x     / <0.01 ng/mL / 1132 U/L / x     / 26.1 ng/mL    POCT Blood Glucose.: 141 mg/dL (03 Dec 2018 09:15)

## 2018-12-03 NOTE — CONSULT NOTE ADULT - ASSESSMENT
Impression: 58y yo Male with Ascites, liver cirrhosis, Hep C  Differential is broad, and the diagnosis will be clarified by ultrasound guided paracentesis.     Problem Ascites   Recommendation:  Will discuss further with attending   Check Hep A Ab, Hep B SAg, Hep B SAB, Hep C Ab, SHARON, Smooth Muscle antibody, SHARON, Ceruloplasmin, Ferritin, Transferrin Saturation, SPEP, Alpha fetoprotein,   Ultrasound guided diagnostic paracentesis: Obtain serum albumin same day as paracentesis.  Obtain fluid studies: Cultures, Cell count, albumin, protein, amylase, triglycerides, cytology, AFB smear and culture  Further treatment is based on diagnosis.  Daily weights  Aldactone   Lasix Impression: 58y yo Male with Ascites, liver cirrhosis, Hep C  Differential is broad, and the diagnosis will be clarified by ultrasound guided paracentesis.     Problem Ascites   Recommendation:  Will discuss further with attending   Check Hep A Ab, Hep B SAg, Hep B SAB, Hep C Ab, SHARON, Smooth Muscle antibody, SHARON, Ceruloplasmin, Ferritin, Transferrin Saturation, SPEP, Alpha fetoprotein,   Ultrasound guided diagnostic paracentesis: Obtain serum albumin same day as paracentesis.  Obtain fluid studies: Cultures, Cell count, albumin, protein, amylase, triglycerides, cytology, AFB smear and culture  Further treatment is based on diagnosis.  Continue Antibiotics   Daily Weights     Problem Liver Cirrhosis   will discuss further with attending Impression: 58y yo Male with Ascites, liver cirrhosis, Hep C  Differential is broad, and the diagnosis will be clarified by ultrasound guided paracentesis.     Problem Ascites   Recommendation:  Will discuss further with attending   Check Hep A Ab, Hep B SAg, Hep B SAB, Hep C Ab, SHARON, Smooth Muscle antibody, SHARON, Ceruloplasmin, Ferritin, Transferrin Saturation, SPEP, Alpha fetoprotein,   Ultrasound guided diagnostic paracentesis: Obtain serum albumin same day as paracentesis.  Obtain fluid studies: Cultures, Cell count, albumin, protein, amylase, triglycerides, cytology, AFB smear and culture  Further treatment is based on diagnosis.  Continue Antibiotics   Daily Weights     Problem Liver Cirrhosis   will discuss further with attending   MELD Score 39 Impression: 58y yo Male with Ascites, liver cirrhosis, Hep C  Differential is broad, and the diagnosis will be clarified by ultrasound guided paracentesis.     Problem Ascites/Liver Cirrhosis and Possible SBP   Recommendation:  Will discuss further with attending   Check Hep C Viral Load/ Hep C RNA Qualitative   Ultrasound guided diagnostic paracentesis: Obtain serum albumin same day as paracentesis. Obtain less than 1L of fluid  Obtain fluid studies: Cultures, Cell count, albumin, total protein,   Continue Antibiotics   MELD Score 39   Child [place]tree Class C Impression: 58y yo Male with Ascites, liver cirrhosis, history of Hepatitis C. No asterixis noted on exam most likely not hepatic encephalopathy.     Problem Ascites/Liver Cirrhosis and Possible SBP   Recommendation:  Check Hep C Viral Load/ Hep C RNA Qualitative   Ultrasound guided diagnostic paracentesis: Obtain serum albumin same day as paracentesis. Obtain less than 1L of fluid  Obtain fluid studies: Cultures, Cell count, albumin, total protein,   Continue Antibiotics   MELD Score 39   Child [place] Class C    Acute vs Chronic renal failure  Nephrology on board   If acute patient needs work-up for hepatorenal syndrome Impression: 58y yo Male with Ascites, liver cirrhosis, history of Hepatitis C. No asterixis noted on exam most likely not hepatic encephalopathy.     1) Ascites  - Follow up Cell count, culture, albumin, total protein  - If >250 PMNs, start albumin infusion  - continue antibiotics.  - No large volume paracentesis until HRS ruled out    2)   Problem Ascites/Liver Cirrhosis, altered mental status, acute vs. chronic kidney disease    Recommendation:  Check Hep C Viral Load/ Hep C RNA Qualitative   Ultrasound guided diagnostic paracentesis: Obtain serum albumin same day as paracentesis. Obtain less than 1L of fluid  Obtain fluid studies: Cultures, Cell count, albumin, total protein,   Continue Antibiotics   MELD Score 39   Child [place]tree Class C    Acute vs Chronic renal failure  Nephrology on board   If acute patient needs work-up for hepatorenal syndrome Impression: 58y yo Male with Ascites, liver cirrhosis, history of Hepatitis C. No asterixis noted on exam most likely not hepatic encephalopathy.   MELD Score 39   Child [place]tree Class C    1) Ascites  - Follow up Cell count, culture, albumin, total protein  - If >250 PMNs, start albumin infusion  - continue antibiotics.  - No large volume paracentesis until HRS ruled out    2) Elevated Creatinine  Last creatinine prior to admission in October 2017 was normal  -check urine lytes, urine sodium, urine urea to determine if this is prerenal  - appreciate renal input  - I+O  - Daily lytes  - If this is HRS patient will need albumin infusion  to be added to his levophed    3) anemia  - no evidence of GI bleeding  - continue to monitor hgb and stool output  - EGD as outpatient if no overt bleeding in hospital    4) hx of hcv  - please check HCV RNA (qualitative) to determine if patient still has HCV  Recommendation:  Check Hep C Viral Load/ Hep C RNA Qualitative   Ultrasound guided diagnostic paracentesis: Obtain serum albumin same day as paracentesis. Obtain less than 1L of fluid  Obtain fluid studies: Cultures, Cell count, albumin, total protein,   Continue Antibiotics     5) Altered mental status  - doubt hepatic encephalopathy since patient has no asterixis

## 2018-12-03 NOTE — H&P ADULT - PMH
Alcoholic cirrhosis of liver with ascites    ETOH abuse    Hepatitis C virus infection without hepatic coma, unspecified chronicity    Heroin abuse

## 2018-12-03 NOTE — H&P ADULT - HISTORY OF PRESENT ILLNESS
58 year old male with known hx of Cirrhosis (due to Hep C and ETOH abuse), heroin abuse, chronic back pain, and morbid obesity presented to the ED for AMS after being found by his neighbors on the floor of his home covered in feces. Brought in by EMS, FS found to be 38, patient hypotensive with BP 84/50, and hypothermic (93.9F rectal temp). During that time of examination patient was lethargic and not answering questions appropriately or following commands. Hospital stay complicated by severe agitation and yelling, requiring ativan overnight leading to worsening lethargy.

## 2018-12-03 NOTE — CONSULT NOTE ADULT - ASSESSMENT
LORENZO    - nl renal function on 10/2017   - nl renal anatomy without hydro on CT abd without iv dye   - unclear UOP, zavala out due to leak   - possible ATN due to cirrhosis / hypotension / infection   - doubt simple prerenal state but urine studies will help as low bun/cr ratio typical in cirrhosis  - r/o HRS or abdominal compartment syndrome and rhabdomyolysis component    hypervolemic hyponatremia  anasarca / ascites  r/o sepsis - relative leukocytosis with left shift, hypothermia and hypotension    plan:    - replace zavala  - check UA, U protein /creatinine ratio, U osmolality, calculate FeNa%, urine culture  - please check bladder pressure once zavala placed to evaluate IAH  - pressor support for MAP > 60  - agree with stress dose steroids  - check blood cultures   - empiric broad spectrum abx adjusted for GFR  - consider diagnostic paracentesis, but avoid high volume paracentesis until bp's stabilize  - - would give prn small volume NS boluses for bp response rather than maintenace IVF  - can give albumin 25g iv q8h x 48 hours  - no lasix today  - trend CPK, phos, uric acid, lft's, renal function  - may need dialysis if no improvement in next few days   - no iv dye, nsaids   - obtain better history once MS improves or from corroborating sources   - d/w team / will follow

## 2018-12-03 NOTE — CHART NOTE - NSCHARTNOTEFT_GEN_A_CORE
pt's v agitated, disoriented , possibly 2 to alcohol wd, spoke w/intensivist will give  dose haldol and possibly dose benzo

## 2018-12-03 NOTE — CONSULT NOTE ADULT - SUBJECTIVE AND OBJECTIVE BOX
Patient is a 58y old  Male who presents with a chief complaint of altered mental status (03 Dec 2018 07:50)      HPI:  57 yo male with H/o Hep C, Cirrhosis 2/2 etoh abus and hep c, heroin abuse, chronic back pain presents to the ED with Altered mental status. History obtained via chart review and per ED signout. Patient was found by his neighbors on the floor of his home covered in feces. BIBA and FS found to be 38 and patient hypotensive with BP 84/50, and hypothermic (93.9F rectal temp). patient during time of examination lethargic and not answering questions appropriately or following commands (ex squeeze my hand, or lift arms up),. AOx1 (oriented to self only).    per chart review patient was admited to [facility] oct 2017 with tense abdomen 2/2 ascites, had a large volume paracentesis done and was discharged home on lasix and spironolactone.     Overnight on levophed 0.05. Required ativan overnight.       PAST MEDICAL & SURGICAL HISTORY:  Cirrhosis      SOCIAL HX:   Smoking UTO                        ETOH   daily      FAMILY HISTORY:      Review of system:  See HPI    Allergies    No Known Allergies    Intolerances    PHYSICAL EXAM    ICU Vital Signs Last 24 Hrs  T(C): 36.4 (03 Dec 2018 05:10), Max: 36.4 (03 Dec 2018 05:10)  T(F): 97.6 (03 Dec 2018 05:10), Max: 97.6 (03 Dec 2018 05:10)  HR: 90 (03 Dec 2018 06:01) (68 - 113)  BP: 112/53 (03 Dec 2018 06:01) (73/36 - 142/91)  BP(mean): 77 (03 Dec 2018 06:01) (50 - 112)  RR: 15 (03 Dec 2018 05:10) (15 - 24)  SpO2: 96% (03 Dec 2018 05:10) (89% - 100%)    I&O's Detail    02 Dec 2018 07:01  -  03 Dec 2018 07:00  --------------------------------------------------------  IN:    dextrose 5% + sodium chloride 0.9%.: 800 mL    IV PiggyBack: 250 mL    norepinephrine Infusion: 64 mL    Sodium Chloride 0.9% IV Bolus: 1000 mL  Total IN: 2114 mL    OUT:  Total OUT: 0 mL    Total NET: 2114 mL    03 Dec 2018 07:01  -  03 Dec 2018 09:55  --------------------------------------------------------  IN:    dextrose 5% + sodium chloride 0.9%.: 300 mL    norepinephrine Infusion: 25 mL  Total IN: 325 mL    OUT:  Total OUT: 0 mL    Total NET: 325 mL    General: Comfortable in bed  HEENT:  on NC  Lymph node: No palpable LN             Lungs: SHIREEN over bases  Cardiovascular: RRR, S1S2  Abdomen: BS+ve; distended  Extremities: Bilateral LE edema   Neurological:  Lethargic opens eyes to stimuli;     LABS:                        8.2    11.35 )-----------( 130      ( 03 Dec 2018 06:35 )             24.0   12-03    120<L>  |  85<L>  |  36<H>  ----------------------------<  132<H>  4.6   |  20  |  3.7<H>    Ca    7.1<L>      03 Dec 2018 06:35  Mg     1.7     12-03    TPro  7.3  /  Alb  2.0<L>  /  TBili  6.2<H>  /  DBili  4.2<H>  /  AST  306<H>  /  ALT  64<H>  /  AlkPhos  90  12-03    PT/INR - ( 03 Dec 2018 06:35 )   PT: 35.40 sec;   INR: 3.20 ratio         PTT - ( 03 Dec 2018 06:35 )  PTT:54.2 sec                                           CARDIAC MARKERS ( 03 Dec 2018 06:35 )  x     / <0.01 ng/mL / 6083 U/L / x     / 78.0 ng/mL  CARDIAC MARKERS ( 02 Dec 2018 12:24 )  x     / <0.01 ng/mL / 1132 U/L / x     / 26.1 ng/mL    LIVER FUNCTIONS - ( 03 Dec 2018 06:35 )  Alb: 2.0 g/dL / Pro: 7.3 g/dL / ALK PHOS: 90 U/L / ALT: 64 U/L / AST: 306 U/L / GGT: x           Lactate (12-02-18 @ 12:50): 5.4<H>    Blood Gas Profile - Venous (12.02.18 @ 12:50)    pH, Venous: 7.33    pCO2, Venous: 40 mmHg        MEDICATIONS  (STANDING):  cefTRIAXone   IVPB      cefTRIAXone   IVPB 2 Gram(s) IV Intermittent every 24 hours  dextrose 5% + sodium chloride 0.9%. 1000 milliLiter(s) (100 mL/Hr) IV Continuous <Continuous>  hydrocortisone sodium succinate Injectable 100 milliGRAM(s) IV Push every 6 hours  norepinephrine Infusion 0.05 MICROgram(s)/kG/Min (6.577 mL/Hr) IV Continuous <Continuous>  sodium chloride 0.9%. 1000 milliLiter(s) (75 mL/Hr) IV Continuous <Continuous>    MEDICATIONS  (PRN):  dextrose 50% Injectable 50 milliLiter(s) IV Push every 2 hours PRN if fingerstick <70      Radiology:  Chest xray : NO ACPD    < from: CT Abdomen and Pelvis No Cont (12.02.18 @ 17:35) >  IMPRESSION:     Massive ascites throughout the abdomen and pelvis.    Hepatic cirrhosis.    < end of copied text >    < from: CT Head No Cont (12.02.18 @ 13:35) >  IMPRESSION:     1.  Slightly limited study due to patient motion and motion artifact.     2.  Mild periventricular and subcortical white matter chronic small   vessel ischemic changes.    3.  Old lacunar infarct versus prominent sulcus or neuroepithelial cyst   medial left occipital lobe.    4.  Left frontal and anterior ethmoid sinus disease.    5.  No acute mass effect, midline shift or hemorrhage.      < end of copied text >

## 2018-12-03 NOTE — CONSULT NOTE ADULT - SUBJECTIVE AND OBJECTIVE BOX
Gastroenterology Consult    58y yo Male with ascites history of hepatitis C unsure if treated, ETOH abuse    Patient is a 58y old  Male who was found in his house on the floor covered in feces brought in by EMS with altered mental status, found hypotensive and hypothermic. Patient was admitted to [facility] in October 2017 for SBP and ascites had a paracentesis and was on Lasix and aldactone.           PAST MEDICAL & SURGICAL HISTORY:  PAST MEDICAL & SURGICAL HISTORY:  As per charts Hepatitis C  Liver Cirrhosis   ETOH abuse        Allergies; Allergies    No Known Allergies    Intolerances          Medications: Allergies    No Known Allergies    Intolerances          Review of Systems: Unable to obtain patient given Ativan for agitation     Physical Examination:  T(C): 36.4 (12-03-18 @ 05:10), Max: 36.4 (12-03-18 @ 05:10)  HR: 90 (12-03-18 @ 06:01) (68 - 113)  BP: 112/53 (12-03-18 @ 06:01) (73/36 - 142/91)  RR: 15 (12-03-18 @ 05:10) (15 - 23)  SpO2: 96% (12-03-18 @ 05:10) (89% - 99%)    PHYSICAL EXAM  General: Morbidly Obese  HEENT: MMM, conjunctiva and sclera clear  Neck-No thyromegaly   Lungs: Clear, no Rhonchi  Heart-Normal s1/s2  Gastrointestinal: Largely Distended Rigid Abdomen. +Ascites   Skin: Warm and dry.   LE- +anasarca +3 pitting edema        CT Abdomen and Pelvis No Cont:   EXAM:  CT ABDOMEN AND PELVIS            PROCEDURE DATE:  12/02/2018            INTERPRETATION:  CLINICAL STATEMENT: Abdominal distention.      TECHNIQUE: Contiguous axial CT images were obtained from the lower chest   to the pubic symphysis withoutintravenous contrast.  Oral contrast was   not administered.  Reformatted images in the coronal and sagittal planes   were acquired.    COMPARISON CT: 7/31/2016    OTHER STUDIES USED FOR CORRELATION: None.       FINDINGS:    LOWER CHEST: There is dependent atelectasis with trace pleural effusions.   The heart is mildly enlarged without pericardial effusion. Gynecomastia   is noted.    HEPATOBILIARY: The unenhanced liver is shrunken with nodular contour   compatible with cirrhosis. The gallbladderis present.    SPLEEN: Unremarkable.    PANCREAS: Unremarkable.    ADRENAL GLANDS: Unremarkable.    KIDNEYS: Normal in size and location, without renal calculus or   hydronephrosis.    ABDOMINOPELVIC NODES: Unremarkable.    PELVIC ORGANS: The urinarybladder is collapsed. The prostate gland is   not enlarged.    PERITONEUM/MESENTERY/BOWEL: There is massive ascites throughout the   abdomen and pelvis, including within an umbilical hernia. No evidence of   bowel obstruction or free air. There is a catheter within the rectum.    BONES/SOFT TISSUES: No suspicious osseous lesion is seen. There is mild   soft tissue anasarca.      IMPRESSION:     Massive ascites throughout the abdomen and pelvis.    Hepatic cirrhosis.                  BUD ARELLANO, ATTENDING RADIOLOGIST  This document has been electronically signed. Dec  2 2018  6:43PM             (12-02-18 @ 17:35)              Impression: 58y yo Male with Ascites.  Differential is broad, and the diagnosis will be clarified by ultrasound guided paracentesis.  This can be done by IR or ICU/CCU resident at [facility] perform it.    Recommendation:  Check Hep A Ab, Hep B SAg, Hep B SAB, Hep C Ab, SHARON, Smooth Muscle antibody, SHARON, Ceruloplasmin, Ferritin, Transferrin Saturation, SPEP, Alpha fetoprotein,   Ultrasound guided diagnostic paracentesis: Obtain serum albumin same day as paracentesis.  Obtain fluid studies: Cultures, Cell count, albumin, protein, amylase, triglycerides, cytology, AFB smear and culture  CT scan of abdomen with IV and Oral contrast  Further treatment is based on diagnosis.  2g Sodium diet  Daily weights  Aldactone   Lasix Gastroenterology Consult    58y yo Male with ascites history of hepatitis C unsure if treated, ETOH abuse    Patient is a 58y old  Male who was found in his house on the floor covered in feces brought in by EMS with altered mental status, found hypotensive and hypothermic. Patient was admitted to [facility] in October 2017 for SBP and ascites had a paracentesis and was on Lasix and aldactone.           PAST MEDICAL & SURGICAL HISTORY:  PAST MEDICAL & SURGICAL HISTORY:  As per charts Hepatitis C  Liver Cirrhosis   ETOH abuse        Allergies; Allergies    No Known Allergies    Intolerances          Medications: Allergies    No Known Allergies    Intolerances          Review of Systems: Unable to obtain patient given Ativan for agitation     Physical Examination:  ICU Vital Signs Last 24 Hrs  T(C): 36.4 (03 Dec 2018 05:10), Max: 36.4 (03 Dec 2018 05:10)  T(F): 97.6 (03 Dec 2018 05:10), Max: 97.6 (03 Dec 2018 05:10)  HR: 90 (03 Dec 2018 06:01) (68 - 113)  BP: 112/53 (03 Dec 2018 06:01) (73/36 - 142/91)  BP(mean): 77 (03 Dec 2018 06:01) (50 - 112)  ABP: --  ABP(mean): --  RR: 15 (03 Dec 2018 05:10) (15 - 23)  SpO2: 96% (03 Dec 2018 05:10) (89% - 99%)      PHYSICAL EXAM  General: Morbidly Obese  HEENT: MMM, conjunctiva and sclera clear  Neck-No thyromegaly   Lungs: Clear, no Rhonchi  Heart-Normal s1/s2  Gastrointestinal: Largely Distended Rigid Abdomen. +Ascites +Bowel Sounds  Skin: Warm and dry.   LE- +anasarca +3 pitting edema        CT Abdomen and Pelvis No Cont:   EXAM:  CT ABDOMEN AND PELVIS            PROCEDURE DATE:  12/02/2018            INTERPRETATION:  CLINICAL STATEMENT: Abdominal distention.      TECHNIQUE: Contiguous axial CT images were obtained from the lower chest   to the pubic symphysis withoutintravenous contrast.  Oral contrast was   not administered.  Reformatted images in the coronal and sagittal planes   were acquired.    COMPARISON CT: 7/31/2016    OTHER STUDIES USED FOR CORRELATION: None.       FINDINGS:    LOWER CHEST: There is dependent atelectasis with trace pleural effusions.   The heart is mildly enlarged without pericardial effusion. Gynecomastia   is noted.    HEPATOBILIARY: The unenhanced liver is shrunken with nodular contour   compatible with cirrhosis. The gallbladderis present.    SPLEEN: Unremarkable.    PANCREAS: Unremarkable.    ADRENAL GLANDS: Unremarkable.    KIDNEYS: Normal in size and location, without renal calculus or   hydronephrosis.    ABDOMINOPELVIC NODES: Unremarkable.    PELVIC ORGANS: The urinarybladder is collapsed. The prostate gland is   not enlarged.    PERITONEUM/MESENTERY/BOWEL: There is massive ascites throughout the   abdomen and pelvis, including within an umbilical hernia. No evidence of   bowel obstruction or free air. There is a catheter within the rectum.    BONES/SOFT TISSUES: No suspicious osseous lesion is seen. There is mild   soft tissue anasarca.      IMPRESSION:     Massive ascites throughout the abdomen and pelvis.    Hepatic cirrhosis.                  BUD ARELLANO, ATTENDING RADIOLOGIST  This document has been electronically signed. Dec  2 2018  6:43PM             (12-02-18 @ 17:35) Gastroenterology Consult    58y yo Male with ascites history of hepatitis C unsure if treated, ETOH abuse    Patient is a 58y old  Male who was found in his house on the floor covered in feces brought in by EMS with altered mental status, found hypotensive and hypothermic. Patient was admitted to [facility] in October 2017 for SBP and ascites had a paracentesis and was on Lasix and aldactone.           PAST MEDICAL & SURGICAL HISTORY:  PAST MEDICAL & SURGICAL HISTORY:  As per charts Hepatitis C  Liver Cirrhosis   ETOH abuse        Allergies; Allergies    No Known Allergies    Intolerances          Medications: Allergies    No Known Allergies    Intolerances          Review of Systems: Unable to obtain patient given Ativan for agitation     Physical Examination:  ICU Vital Signs Last 24 Hrs  T(C): 36.4 (03 Dec 2018 05:10), Max: 36.4 (03 Dec 2018 05:10)  T(F): 97.6 (03 Dec 2018 05:10), Max: 97.6 (03 Dec 2018 05:10)  HR: 90 (03 Dec 2018 06:01) (68 - 113)  BP: 112/53 (03 Dec 2018 06:01) (73/36 - 142/91)  BP(mean): 77 (03 Dec 2018 06:01) (50 - 112)  ABP: --  ABP(mean): --  RR: 15 (03 Dec 2018 05:10) (15 - 23)  SpO2: 96% (03 Dec 2018 05:10) (89% - 99%)      PHYSICAL EXAM  General: Morbidly Obese  HEENT: MMM, Normocephalic atraumatic   Neck-No thyromegaly   Lungs: Clear, no Rhonchi  Heart-Normal s1/s2  Gastrointestinal: Largely Distended Rigid Abdomen. +Ascites +Bowel Sounds +umbilical hernia   Skin: Warm and dry.   LE- +anasarca +3 pitting edema        CT Abdomen and Pelvis No Cont:   EXAM:  CT ABDOMEN AND PELVIS            PROCEDURE DATE:  12/02/2018            INTERPRETATION:  CLINICAL STATEMENT: Abdominal distention.      TECHNIQUE: Contiguous axial CT images were obtained from the lower chest   to the pubic symphysis withoutintravenous contrast.  Oral contrast was   not administered.  Reformatted images in the coronal and sagittal planes   were acquired.    COMPARISON CT: 7/31/2016    OTHER STUDIES USED FOR CORRELATION: None.       FINDINGS:    LOWER CHEST: There is dependent atelectasis with trace pleural effusions.   The heart is mildly enlarged without pericardial effusion. Gynecomastia   is noted.    HEPATOBILIARY: The unenhanced liver is shrunken with nodular contour   compatible with cirrhosis. The gallbladderis present.    SPLEEN: Unremarkable.    PANCREAS: Unremarkable.    ADRENAL GLANDS: Unremarkable.    KIDNEYS: Normal in size and location, without renal calculus or   hydronephrosis.    ABDOMINOPELVIC NODES: Unremarkable.    PELVIC ORGANS: The urinarybladder is collapsed. The prostate gland is   not enlarged.    PERITONEUM/MESENTERY/BOWEL: There is massive ascites throughout the   abdomen and pelvis, including within an umbilical hernia. No evidence of   bowel obstruction or free air. There is a catheter within the rectum.    BONES/SOFT TISSUES: No suspicious osseous lesion is seen. There is mild   soft tissue anasarca.      IMPRESSION:     Massive ascites throughout the abdomen and pelvis.    Hepatic cirrhosis.                  BUD ARELLANO, ATTENDING RADIOLOGIST  This document has been electronically signed. Dec  2 2018  6:43PM             (12-02-18 @ 17:35) Gastroenterology Consult    58y yo Male with ascites history of hepatitis C unsure if treated, ETOH abuse    Patient is a 58y old  Male who was found in his house on the floor covered in feces brought in by EMS with altered mental status, found hypotensive and hypothermic. Patient was admitted to [facility] in October 2017 for SBP and ascites had a paracentesis and was on Lasix and aldactone.           PAST MEDICAL & SURGICAL HISTORY:  PAST MEDICAL & SURGICAL HISTORY:  As per charts Hepatitis C  Liver Cirrhosis   ETOH abuse        Allergies; Allergies    No Known Allergies    Intolerances          Medications: Allergies    No Known Allergies    Intolerances          Review of Systems: Unable to obtain patient given Ativan for agitation     Physical Examination:  ICU Vital Signs Last 24 Hrs  T(C): 36.4 (03 Dec 2018 05:10), Max: 36.4 (03 Dec 2018 05:10)  T(F): 97.6 (03 Dec 2018 05:10), Max: 97.6 (03 Dec 2018 05:10)  HR: 90 (03 Dec 2018 06:01) (68 - 113)  BP: 112/53 (03 Dec 2018 06:01) (73/36 - 142/91)  BP(mean): 77 (03 Dec 2018 06:01) (50 - 112)  ABP: --  ABP(mean): --  RR: 15 (03 Dec 2018 05:10) (15 - 23)  SpO2: 96% (03 Dec 2018 05:10) (89% - 99%)      PHYSICAL EXAM  General: Morbidly Obese  HEENT: MMM, Normocephalic atraumatic   Neck-No thyromegaly   Lungs: Clear, no Rhonchi  Heart-Normal s1/s2  Gastrointestinal: Largely Distended Rigid Abdomen. +Ascites +Bowel Sounds +umbilical hernia   Skin: Warm and dry.   LE- +anasarca +3 pitting edema  Neuro-No asterixis         CT Abdomen and Pelvis No Cont:   EXAM:  CT ABDOMEN AND PELVIS            PROCEDURE DATE:  12/02/2018            INTERPRETATION:  CLINICAL STATEMENT: Abdominal distention.      TECHNIQUE: Contiguous axial CT images were obtained from the lower chest   to the pubic symphysis withoutintravenous contrast.  Oral contrast was   not administered.  Reformatted images in the coronal and sagittal planes   were acquired.    COMPARISON CT: 7/31/2016    OTHER STUDIES USED FOR CORRELATION: None.       FINDINGS:    LOWER CHEST: There is dependent atelectasis with trace pleural effusions.   The heart is mildly enlarged without pericardial effusion. Gynecomastia   is noted.    HEPATOBILIARY: The unenhanced liver is shrunken with nodular contour   compatible with cirrhosis. The gallbladderis present.    SPLEEN: Unremarkable.    PANCREAS: Unremarkable.    ADRENAL GLANDS: Unremarkable.    KIDNEYS: Normal in size and location, without renal calculus or   hydronephrosis.    ABDOMINOPELVIC NODES: Unremarkable.    PELVIC ORGANS: The urinarybladder is collapsed. The prostate gland is   not enlarged.    PERITONEUM/MESENTERY/BOWEL: There is massive ascites throughout the   abdomen and pelvis, including within an umbilical hernia. No evidence of   bowel obstruction or free air. There is a catheter within the rectum.    BONES/SOFT TISSUES: No suspicious osseous lesion is seen. There is mild   soft tissue anasarca.      IMPRESSION:     Massive ascites throughout the abdomen and pelvis.    Hepatic cirrhosis.                  BUD ARELLANO, ATTENDING RADIOLOGIST  This document has been electronically signed. Dec  2 2018  6:43PM             (12-02-18 @ 17:35) Gastroenterology Consult    58y yo Male with ascites history of hepatitis C unsure if treated, ETOH abuse    Patient is a 58y old  Male who was found in his house on the floor covered in feces brought in by EMS with altered mental status, found hypotensive and hypothermic. Patient was admitted to [facility] in October 2017 for SBP and ascites had a paracentesis and was on Lasix and aldactone.   Further history:Unable to obtain patient given Ativan for agitation     Patient has reportedly been drinking alcohol  (last drink reportedly 7 days ago)      Review of systems: unobtainable    PAST MEDICAL & SURGICAL HISTORY:  As per charts Hepatitis C  Liver Cirrhosis   ETOH abuse        Allergies; Allergies    No Known Allergies        Physical Examination:  ICU Vital Signs Last 24 Hrs  T(C): 36.4 (03 Dec 2018 05:10), Max: 36.4 (03 Dec 2018 05:10)  T(F): 97.6 (03 Dec 2018 05:10), Max: 97.6 (03 Dec 2018 05:10)  HR: 90 (03 Dec 2018 06:01) (68 - 113)  BP: 112/53 (03 Dec 2018 06:01) (73/36 - 142/91)  BP(mean): 77 (03 Dec 2018 06:01) (50 - 112)  ABP: --  ABP(mean): --  RR: 15 (03 Dec 2018 05:10) (15 - 23)  SpO2: 96% (03 Dec 2018 05:10) (89% - 99%)      PHYSICAL EXAM  General: Morbidly Obese  HEENT: MMM, Normocephalic atraumatic   Neck-No thyromegaly   Lungs: Clear, no Rhonchi  Heart-Normal s1/s2  Gastrointestinal: Largely Distended Rigid Abdomen. +Ascites +Bowel Sounds +umbilical hernia   Skin: Warm and dry.   LE- +anasarca +3 pitting edema  Neuro-No asterixis         CT Abdomen and Pelvis No Cont:   EXAM:  CT ABDOMEN AND PELVIS            PROCEDURE DATE:  12/02/2018            INTERPRETATION:  CLINICAL STATEMENT: Abdominal distention.      TECHNIQUE: Contiguous axial CT images were obtained from the lower chest   to the pubic symphysis withoutintravenous contrast.  Oral contrast was   not administered.  Reformatted images in the coronal and sagittal planes   were acquired.    COMPARISON CT: 7/31/2016    OTHER STUDIES USED FOR CORRELATION: None.       FINDINGS:    LOWER CHEST: There is dependent atelectasis with trace pleural effusions.   The heart is mildly enlarged without pericardial effusion. Gynecomastia   is noted.    HEPATOBILIARY: The unenhanced liver is shrunken with nodular contour   compatible with cirrhosis. The gallbladderis present.    SPLEEN: Unremarkable.    PANCREAS: Unremarkable.    ADRENAL GLANDS: Unremarkable.    KIDNEYS: Normal in size and location, without renal calculus or   hydronephrosis.    ABDOMINOPELVIC NODES: Unremarkable.    PELVIC ORGANS: The urinarybladder is collapsed. The prostate gland is   not enlarged.    PERITONEUM/MESENTERY/BOWEL: There is massive ascites throughout the   abdomen and pelvis, including within an umbilical hernia. No evidence of   bowel obstruction or free air. There is a catheter within the rectum.    BONES/SOFT TISSUES: No suspicious osseous lesion is seen. There is mild   soft tissue anasarca.      IMPRESSION:     Massive ascites throughout the abdomen and pelvis.    Hepatic cirrhosis.                  BUD ARELLANO, ATTENDING RADIOLOGIST  This document has been electronically signed. Dec  2 2018  6:43PM             (12-02-18 @ 17:35)

## 2018-12-03 NOTE — H&P ADULT - ASSESSMENT
58 year old male with known hx of Cirrhosis (due to Hep C and ETOH abuse), heroin abuse, chronic back pain, and morbid obesity presented to the ED for AMS after being found by his neighbors on the floor of his home covered in feces. Admitted for Altered Mental Status suspected due to sepsis secondary to SBP v. other unknown source.    1.  Altered Mental Status suspected due to sepsis secondary to SBP v. other unknown source with known hx of ETOH and Heroin abuse, Hep C, and Cirrhosis; complicated by Supra-therapeutic INR  - Pulm following  - GI consult pending  - Diagnostic Paracentesis done this AM to eval for SBP  - Continue rocephin  - Neuro consult and rEEG for now  - Pan culture  - ETOH, ASA, and tylenol levels all negative  - Hold diuretics for now; hold all fluids  - Lactulose to titrate to 1-2bm's daily if patient wakes up  - Thiamine, folic acid  - Utox  - Attempt to wean off of levophed, keep MAP over 65  - Give 2.5 IV Vitamin K for INR reversal    2. Severe LORENZO   - SCr around 3, from baseline 1.5  - Keep I=O  - Place zavala catheter  - Hold fluids and diuretics  - Renal follow up  - Check urine lytes    3. Hyponatremia - suspect hypervolemic (dilutional)  - Check Serum and Urine Osm  - Nephrology follow up    4. DVT Prophylaxis- ICD's

## 2018-12-03 NOTE — H&P ADULT - NSHPPHYSICALEXAM_GEN_ALL_CORE
Constitutional: Morbidly obese, poorly functional, poor self care  Head: Atraumatic.  Eyes: PERRLA. EOMI without discomfort.   ENT: No nasal discharge. Mucous membranes moist.  Neck: Supple. Painless ROM.  Cardiovascular: Regular rhythm. Regular rate. Normal S1 and S2. No murmurs. 2+ pulses in all extremities.   Pulmonary: Good air entry bilaterally, positive for wheezes bilateally  Abdominal: Diffusely edematous, pitting edema, suprapubic abdominal wall cobblestoning, large umbilical hernia  Extremities: Pelvis stable. No lower extremity edema. Symmetric calves.  Skin: No rashes.   Neuro: Unable to assess

## 2018-12-03 NOTE — PROGRESS NOTE ADULT - SUBJECTIVE AND OBJECTIVE BOX
Pt admitted with altered mental status  and found in the house  on the floor  in feces  brought in by EMS  Last admission in [facility] 1 yr ago  had SBP then  has been on lasix, aldactone PO  H/O Hep C      SOCIAL HISTORY: etoh    REVIEW OF SYSTEMS:    Constitutional: No fever, weight loss or fatigue  ENT:  No difficulty hearing, tinnitus, vertigo; No sinus or throat pain  Neck: No pain or stiffness  Respiratory: No cough, wheezing, chills or hemoptysis  Cardiovascular: No chest pain, palpitations, shortness of breath, dizziness or leg swelling  Gastrointestinal: No abdominal or epigastric pain. No nausea, vomiting or hematemesis; No diarrhea or constipation. No melena or hematochezia.  Neurological: No headaches, memory loss, loss of strength, numbness or tremors  Musculoskeletal: No joint pain or swelling; No muscle, back or extremity pain  Psychiatric: No depression, anxiety, mood swings or difficulty sleeping    MEDICATIONS  (STANDING):  cefTRIAXone   IVPB      cefTRIAXone   IVPB 2 Gram(s) IV Intermittent every 24 hours  dextrose 5% + sodium chloride 0.9%. 1000 milliLiter(s) (100 mL/Hr) IV Continuous <Continuous>  hydrocortisone sodium succinate Injectable 100 milliGRAM(s) IV Push every 6 hours  norepinephrine Infusion 0.05 MICROgram(s)/kG/Min (6.577 mL/Hr) IV Continuous <Continuous>  sodium chloride 0.9%. 1000 milliLiter(s) (75 mL/Hr) IV Continuous <Continuous>    MEDICATIONS  (PRN):  dextrose 50% Injectable 50 milliLiter(s) IV Push every 2 hours PRN if fingerstick <70      Vital Signs Last 24 Hrs  T(C): 36.4 (03 Dec 2018 05:10), Max: 36.4 (03 Dec 2018 05:10)  T(F): 97.6 (03 Dec 2018 05:10), Max: 97.6 (03 Dec 2018 05:10)  HR: 90 (03 Dec 2018 06:01) (68 - 113)  BP: 112/53 (03 Dec 2018 06:01) (73/36 - 142/91)  BP(mean): 77 (03 Dec 2018 06:01) (50 - 112)  RR: 15 (03 Dec 2018 05:10) (15 - 24)  SpO2: 96% (03 Dec 2018 05:10) (89% - 100%)    PHYSICAL EXAM:    Constitutional: NAD, well-groomed, well-developed  HEENT: PERRLA, EOMI, Normal Hearing, MMM  Neck: No LAD, No JVD  Back: Normal spine flexure, No CVA tenderness  Respiratory: CTAB/L  Cardiovascular: S1 and S2, RRR, no M/G/R  Gastrointestinal: BS+, soft, NT/ND  Extremities: No peripheral edema  Vascular: 2+ peripheral pulses  Neurological: A/O x 3, no focal deficits    LABS:                        8.2    11.35 )-----------( 130      ( 03 Dec 2018 06:35 )             24.0     12-03    120<L>  |  85<L>  |  36<H>  ----------------------------<  132<H>  4.6   |  20  |  3.7<H>    Ca    7.1<L>      03 Dec 2018 06:35  Mg     1.7     12-03    TPro  7.3  /  Alb  2.0<L>  /  TBili  6.2<H>  /  DBili  4.2<H>  /  AST  306<H>  /  ALT  64<H>  /  AlkPhos  90  12-03    PT/INR - ( 03 Dec 2018 06:35 )   PT: 35.40 sec;   INR: 3.20 ratio         PTT - ( 03 Dec 2018 06:35 )  PTT:54.2 sec    Drug Screen Urine:  Alcohol Level  Alcohol, Blood: <10 mg/dL (12-02-18 @ 12:24)        RADIOLOGY & ADDITIONAL STUDIES:  reviewed in PACS Pt admitted with altered mental status  and found in the house  on the floor  in feces  brought in by EMS  Last admission in [facility] 1 yr ago  had SBP then  has been on lasix, aldactone PO  H/O Hep C      SOCIAL HISTORY: etoh    REVIEW OF SYSTEMS:    MEDICATIONS  (STANDING):  cefTRIAXone   IVPB      cefTRIAXone   IVPB 2 Gram(s) IV Intermittent every 24 hours  dextrose 5% + sodium chloride 0.9%. 1000 milliLiter(s) (100 mL/Hr) IV Continuous <Continuous>  hydrocortisone sodium succinate Injectable 100 milliGRAM(s) IV Push every 6 hours  norepinephrine Infusion 0.05 MICROgram(s)/kG/Min (6.577 mL/Hr) IV Continuous <Continuous>  sodium chloride 0.9%. 1000 milliLiter(s) (75 mL/Hr) IV Continuous <Continuous>    MEDICATIONS  (PRN):  dextrose 50% Injectable 50 milliLiter(s) IV Push every 2 hours PRN if fingerstick <70      Vital Signs Last 24 Hrs  T(C): 36.4 (03 Dec 2018 05:10), Max: 36.4 (03 Dec 2018 05:10)  T(F): 97.6 (03 Dec 2018 05:10), Max: 97.6 (03 Dec 2018 05:10)  HR: 90 (03 Dec 2018 06:01) (68 - 113)  BP: 112/53 (03 Dec 2018 06:01) (73/36 - 142/91)  BP(mean): 77 (03 Dec 2018 06:01) (50 - 112)  RR: 15 (03 Dec 2018 05:10) (15 - 24)  SpO2: 96% (03 Dec 2018 05:10) (89% - 100%)    PHYSICAL EXAM:    Constitutional:lethargic  HEENT: PERRLA, EOMI, Normal Hearing, MMM  Neck: No LAD, No JVD  Back: Normal spine flexure, No CVA tenderness  Respiratory: CTAB/L  Cardiovascular: S1 and S2, RRR, no M/G/R  Gastrointestinal:  (+++) ascitis  Extremities: (+++) peripheral edema  Vascular: (-) peripheral pulses  Neurological: lethargic     LABS:                        8.2    11.35 )-----------( 130      ( 03 Dec 2018 06:35 )             24.0     12-03    120<L>  |  85<L>  |  36<H>  ----------------------------<  132<H>  4.6   |  20  |  3.7<H>    Ca    7.1<L>      03 Dec 2018 06:35  Mg     1.7     12-03    TPro  7.3  /  Alb  2.0<L>  /  TBili  6.2<H>  /  DBili  4.2<H>  /  AST  306<H>  /  ALT  64<H>  /  AlkPhos  90  12-03    PT/INR - ( 03 Dec 2018 06:35 )   PT: 35.40 sec;   INR: 3.20 ratio         PTT - ( 03 Dec 2018 06:35 )  PTT:54.2 sec    Drug Screen Urine:  Alcohol Level  Alcohol, Blood: <10 mg/dL (12-02-18 @ 12:24)        RADIOLOGY & ADDITIONAL STUDIES:  reviewed in PACS

## 2018-12-03 NOTE — CONSULT NOTE ADULT - SUBJECTIVE AND OBJECTIVE BOX
NEPHROLOGY CONSULTATION NOTE    57 yo wm with pmh of etoh abuse, cirrhosis, ascites, hep c with baseline normal renal function admitted to ICU with altered mental status, hypoglycemia, hypotension, leukocytosis and LORENZO.  Pt found on floor in home.  Now sedated and unable to give history.    PAST MEDICAL & SURGICAL HISTORY:    Allergies:  No Known Allergies    Home Medications Reviewed    SOCIAL HISTORY:  Denies ETOH,Smoking,   FAMILY HISTORY:        REVIEW OF SYSTEMS:  unobtainable    PHYSICAL EXAM:  sedated  jaundiced  obese  cta b/l  rrr  distention  + anasarca  groin TLC  no [facility] Medications:   MEDICATIONS  (STANDING):  cefTRIAXone   IVPB      cefTRIAXone   IVPB 2 Gram(s) IV Intermittent every 24 hours  dextrose 5% + sodium chloride 0.9%. 1000 milliLiter(s) (100 mL/Hr) IV Continuous <Continuous>  hydrocortisone sodium succinate Injectable 100 milliGRAM(s) IV Push every 6 hours  norepinephrine Infusion 0.05 MICROgram(s)/kG/Min (6.577 mL/Hr) IV Continuous <Continuous>  sodium chloride 0.9%. 1000 milliLiter(s) (75 mL/Hr) IV Continuous <Continuous>        VITALS:  T(F): 97.6 (12-03-18 @ 05:10), Max: 97.6 (12-03-18 @ 05:10)  HR: 90 (12-03-18 @ 06:01)  BP: 112/53 (12-03-18 @ 06:01)  RR: 15 (12-03-18 @ 05:10)  SpO2: 96% (12-03-18 @ 05:10)  Wt(kg): --    12-02 @ 07:01  -  12-03 @ 07:00  --------------------------------------------------------  IN: 2114 mL / OUT: 0 mL / NET: 2114 mL    12-03 @ 07:01  -  12-03 @ 10:10  --------------------------------------------------------  IN: 325 mL / OUT: 0 mL / NET: 325 mL        Weight (kg): 140.3 (12-02 @ 21:14)    LABS:  12-03    120<L>  |  85<L>  |  36<H>  ----------------------------<  132<H>  4.6   |  20  |  3.7<H>    Ca    7.1<L>      03 Dec 2018 06:35  Mg     1.7     12-03    TPro  7.3  /  Alb  2.0<L>  /  TBili  6.2<H>  /  DBili  4.2<H>  /  AST  306<H>  /  ALT  64<H>  /  AlkPhos  90  12-03                          8.2    11.35 )-----------( 130      ( 03 Dec 2018 06:35 )             24.0       Urine Studies:        RADIOLOGY & ADDITIONAL STUDIES:

## 2018-12-04 DIAGNOSIS — T83.9XXA UNSPECIFIED COMPLICATION OF GENITOURINARY PROSTHETIC DEVICE, IMPLANT AND GRAFT, INITIAL ENCOUNTER: ICD-10-CM

## 2018-12-04 LAB
ALBUMIN FLD-MCNC: 0.3 G/DL — SIGNIFICANT CHANGE UP
ALBUMIN SERPL ELPH-MCNC: 1.5 G/DL — LOW (ref 3.5–5.2)
ALP SERPL-CCNC: 96 U/L — SIGNIFICANT CHANGE UP (ref 30–115)
ALT FLD-CCNC: 74 U/L — HIGH (ref 0–41)
ANION GAP SERPL CALC-SCNC: 16 MMOL/L — HIGH (ref 7–14)
APTT BLD: 45.4 SEC — HIGH (ref 27–39.2)
APTT BLD: 56.6 SEC — HIGH (ref 27–39.2)
AST SERPL-CCNC: 290 U/L — HIGH (ref 0–41)
BILIRUB DIRECT SERPL-MCNC: 3.6 MG/DL — HIGH (ref 0–0.2)
BILIRUB INDIRECT FLD-MCNC: 1.9 MG/DL — HIGH (ref 0.2–1.2)
BILIRUB SERPL-MCNC: 5.5 MG/DL — HIGH (ref 0.2–1.2)
BUN SERPL-MCNC: 44 MG/DL — HIGH (ref 10–20)
CALCIUM SERPL-MCNC: 6.7 MG/DL — LOW (ref 8.5–10.1)
CHLORIDE SERPL-SCNC: 86 MMOL/L — LOW (ref 98–110)
CHLORIDE UR-SCNC: <20 — SIGNIFICANT CHANGE UP
CK SERPL-CCNC: 3595 U/L — HIGH (ref 0–225)
CO2 SERPL-SCNC: 19 MMOL/L — SIGNIFICANT CHANGE UP (ref 17–32)
CREAT ?TM UR-MCNC: 198 MG/DL — SIGNIFICANT CHANGE UP
CREAT SERPL-MCNC: 4.4 MG/DL — CRITICAL HIGH (ref 0.7–1.5)
GLUCOSE BLDC GLUCOMTR-MCNC: 113 MG/DL — HIGH (ref 70–99)
GLUCOSE BLDC GLUCOMTR-MCNC: 124 MG/DL — HIGH (ref 70–99)
GLUCOSE BLDC GLUCOMTR-MCNC: 135 MG/DL — HIGH (ref 70–99)
GLUCOSE BLDC GLUCOMTR-MCNC: 141 MG/DL — HIGH (ref 70–99)
GLUCOSE SERPL-MCNC: 123 MG/DL — HIGH (ref 70–99)
GRAM STN FLD: SIGNIFICANT CHANGE UP
GRAM STN FLD: SIGNIFICANT CHANGE UP
HCT VFR BLD CALC: 24.4 % — LOW (ref 42–52)
HGB BLD-MCNC: 8.1 G/DL — LOW (ref 14–18)
INR BLD: 2.42 RATIO — HIGH (ref 0.65–1.3)
INR BLD: 2.89 RATIO — HIGH (ref 0.65–1.3)
MCHC RBC-ENTMCNC: 31.4 PG — HIGH (ref 27–31)
MCHC RBC-ENTMCNC: 33.2 G/DL — SIGNIFICANT CHANGE UP (ref 32–37)
MCV RBC AUTO: 94.6 FL — HIGH (ref 80–94)
NRBC # BLD: 0 /100 WBCS — SIGNIFICANT CHANGE UP (ref 0–0)
OSMOLALITY SERPL: 279 MOS/KG — LOW (ref 289–308)
OSMOLALITY UR: 292 MOS/KG — SIGNIFICANT CHANGE UP (ref 50–1400)
PLATELET # BLD AUTO: 80 K/UL — LOW (ref 130–400)
POTASSIUM SERPL-MCNC: 4.7 MMOL/L — SIGNIFICANT CHANGE UP (ref 3.5–5)
POTASSIUM SERPL-SCNC: 4.7 MMOL/L — SIGNIFICANT CHANGE UP (ref 3.5–5)
POTASSIUM UR-SCNC: 59 MMOL/L — SIGNIFICANT CHANGE UP
PROT ?TM UR-MCNC: 286 MG/DLG/24H — SIGNIFICANT CHANGE UP
PROT FLD-MCNC: 1.5 G/DL — SIGNIFICANT CHANGE UP
PROT SERPL-MCNC: 6.8 G/DL — SIGNIFICANT CHANGE UP (ref 6–8)
PROTHROM AB SERPL-ACNC: 26.6 SEC — HIGH (ref 9.95–12.87)
PROTHROM AB SERPL-ACNC: 31.9 SEC — HIGH (ref 9.95–12.87)
RBC # BLD: 2.58 M/UL — LOW (ref 4.7–6.1)
RBC # FLD: 17.4 % — HIGH (ref 11.5–14.5)
SODIUM SERPL-SCNC: 121 MMOL/L — LOW (ref 135–146)
SODIUM UR-SCNC: <20 MMOL/L — SIGNIFICANT CHANGE UP
SPECIMEN SOURCE: SIGNIFICANT CHANGE UP
TYPE + AB SCN PNL BLD: SIGNIFICANT CHANGE UP
UUN UR-MCNC: 165 MG/DL — SIGNIFICANT CHANGE UP
WBC # BLD: 10.9 K/UL — HIGH (ref 4.8–10.8)
WBC # FLD AUTO: 10.9 K/UL — HIGH (ref 4.8–10.8)

## 2018-12-04 PROCEDURE — 99223 1ST HOSP IP/OBS HIGH 75: CPT

## 2018-12-04 RX ORDER — HYDROCORTISONE 20 MG
100 TABLET ORAL EVERY 8 HOURS
Qty: 0 | Refills: 0 | Status: DISCONTINUED | OUTPATIENT
Start: 2018-12-04 | End: 2018-12-06

## 2018-12-04 RX ORDER — CEFTRIAXONE 500 MG/1
2 INJECTION, POWDER, FOR SOLUTION INTRAMUSCULAR; INTRAVENOUS EVERY 12 HOURS
Qty: 0 | Refills: 0 | Status: DISCONTINUED | OUTPATIENT
Start: 2018-12-04 | End: 2018-12-05

## 2018-12-04 RX ORDER — PHYTONADIONE (VIT K1) 5 MG
2.6 TABLET ORAL ONCE
Qty: 0 | Refills: 0 | Status: COMPLETED | OUTPATIENT
Start: 2018-12-04 | End: 2018-12-04

## 2018-12-04 RX ORDER — FUROSEMIDE 40 MG
80 TABLET ORAL ONCE
Qty: 0 | Refills: 0 | Status: COMPLETED | OUTPATIENT
Start: 2018-12-04 | End: 2018-12-04

## 2018-12-04 RX ORDER — LACTULOSE 10 G/15ML
20 SOLUTION ORAL
Qty: 0 | Refills: 0 | Status: DISCONTINUED | OUTPATIENT
Start: 2018-12-04 | End: 2018-12-06

## 2018-12-04 RX ORDER — VANCOMYCIN HCL 1 G
1000 VIAL (EA) INTRAVENOUS
Qty: 0 | Refills: 0 | Status: DISCONTINUED | OUTPATIENT
Start: 2018-12-06 | End: 2018-12-06

## 2018-12-04 RX ORDER — ALBUMIN HUMAN 25 %
140 VIAL (ML) INTRAVENOUS ONCE
Qty: 0 | Refills: 0 | Status: COMPLETED | OUTPATIENT
Start: 2018-12-04 | End: 2018-12-04

## 2018-12-04 RX ORDER — VANCOMYCIN HCL 1 G
1000 VIAL (EA) INTRAVENOUS ONCE
Qty: 0 | Refills: 0 | Status: COMPLETED | OUTPATIENT
Start: 2018-12-04 | End: 2018-12-04

## 2018-12-04 RX ORDER — IPRATROPIUM/ALBUTEROL SULFATE 18-103MCG
3 AEROSOL WITH ADAPTER (GRAM) INHALATION EVERY 6 HOURS
Qty: 0 | Refills: 0 | Status: DISCONTINUED | OUTPATIENT
Start: 2018-12-04 | End: 2018-12-06

## 2018-12-04 RX ORDER — VANCOMYCIN HCL 1 G
1000 VIAL (EA) INTRAVENOUS
Qty: 0 | Refills: 0 | Status: DISCONTINUED | OUTPATIENT
Start: 2018-12-04 | End: 2018-12-04

## 2018-12-04 RX ADMIN — LACTULOSE 20 GRAM(S): 10 SOLUTION ORAL at 10:00

## 2018-12-04 RX ADMIN — Medication 250 MILLIGRAM(S): at 02:31

## 2018-12-04 RX ADMIN — Medication 3 MILLILITER(S): at 14:38

## 2018-12-04 RX ADMIN — LACTULOSE 20 GRAM(S): 10 SOLUTION ORAL at 13:03

## 2018-12-04 RX ADMIN — LACTULOSE 20 GRAM(S): 10 SOLUTION ORAL at 22:30

## 2018-12-04 RX ADMIN — Medication 100 MILLIGRAM(S): at 21:00

## 2018-12-04 RX ADMIN — Medication 80 MILLIGRAM(S): at 15:51

## 2018-12-04 RX ADMIN — Medication 100 MILLIGRAM(S): at 13:03

## 2018-12-04 RX ADMIN — Medication 1 MILLIGRAM(S): at 12:02

## 2018-12-04 RX ADMIN — LACTULOSE 20 GRAM(S): 10 SOLUTION ORAL at 17:43

## 2018-12-04 RX ADMIN — PANTOPRAZOLE SODIUM 40 MILLIGRAM(S): 20 TABLET, DELAYED RELEASE ORAL at 08:14

## 2018-12-04 RX ADMIN — Medication 100.52 MILLIGRAM(S): at 12:09

## 2018-12-04 RX ADMIN — LACTULOSE 20 GRAM(S): 10 SOLUTION ORAL at 15:03

## 2018-12-04 RX ADMIN — Medication 280 GRAM(S): at 13:15

## 2018-12-04 RX ADMIN — Medication 3 MILLILITER(S): at 19:58

## 2018-12-04 RX ADMIN — LACTULOSE 20 GRAM(S): 10 SOLUTION ORAL at 20:59

## 2018-12-04 RX ADMIN — Medication 100 MILLIGRAM(S): at 12:02

## 2018-12-04 RX ADMIN — CEFTRIAXONE 100 GRAM(S): 500 INJECTION, POWDER, FOR SOLUTION INTRAMUSCULAR; INTRAVENOUS at 18:56

## 2018-12-04 RX ADMIN — Medication 100 MILLIGRAM(S): at 05:44

## 2018-12-04 NOTE — PROGRESS NOTE ADULT - ASSESSMENT
SBP  sepsis  Renal failure  Alcoholic liver disease/cirrhosis  Hep C      IV Abx  [place] by  or S/P catheter?  HD  per renal  other management per ICU team

## 2018-12-04 NOTE — CONSULT NOTE ADULT - ASSESSMENT
57yo male admitted with LORENZO, severe ascites 2/2 to liver cirrhosis and enephalopathy with difficult zavala placement.    After multiple attempts with zavala catheter placem,ent it was evident based on the very limited exam that the pt had meatal stenosis. Guide wire was placed and urethral dilitation was performed without difficulty. Urine output was seen. [place] tip zavala would not pass despite dilitation secondary to body habitus and inability to access the glans for stability. Pt had some minimal bleeding from meatus and procedure was aborted. Medical team was present and aware of situation.     If absolutely necessary to have zavala placed, pt will need SPT in OR when medically stable.        All above done with Dr Jones.

## 2018-12-04 NOTE — PROCEDURE NOTE - NSPROCDETAILS_GEN_ALL_CORE
lumen(s) aspirated and flushed/ultrasound guidance/sterile dressing applied/guidewire recovered/sterile technique, catheter placed
location identified, sterile technique used, catheter introduced, fluid drained/ultrasound utilization

## 2018-12-04 NOTE — CONSULT NOTE ADULT - SUBJECTIVE AND OBJECTIVE BOX
HPI:  58 year old male with known hx of Cirrhosis (due to Hep C and ETOH abuse), heroin abuse, chronic back pain, and morbid obesity presented to the ED for AMS after being found by his neighbors on the floor of his home covered in feces. Brought in by EMS, FS found to be 38, patient hypotensive with BP 84/50, and hypothermic (93.9F rectal temp). During that time of examination patient was lethargic and not answering questions appropriately or following commands. Hospital stay complicated by severe agitation and yelling, requiring ativan overnight leading to worsening lethargy. (03 Dec 2018 14:17)    Called to place zavala for Is and Os. Pt with Janny and anasarca 2/2 cirrhosis. Pt has had minimal urine output today and very concentrated as per ICU staff. Bladder US done last PM showed collapsed bladder. No signs of urinary retention.       PAST MEDICAL & SURGICAL HISTORY:  Heroin abuse  Hepatitis C virus infection without hepatic coma, unspecified chronicity  ETOH abuse  Alcoholic cirrhosis of liver with ascites  No significant past surgical history      MEDICATIONS  (STANDING):  ALBUTerol/ipratropium for Nebulization 3 milliLiter(s) Nebulizer every 6 hours  cefTRIAXone   IVPB 2 Gram(s) IV Intermittent every 12 hours  folic acid 1 milliGRAM(s) Oral daily  hydrocortisone sodium succinate Injectable 100 milliGRAM(s) IV Push every 8 hours  lactulose Syrup 20 Gram(s) Oral every 2 hours  pantoprazole    Tablet 40 milliGRAM(s) Oral before breakfast  rifaximin 550 milliGRAM(s) Oral two times a day  thiamine 100 milliGRAM(s) Oral daily    MEDICATIONS  (PRN):  dextrose 50% Injectable 50 milliLiter(s) IV Push every 2 hours PRN if fingerstick <70      Allergies    No Known Allergies    Intolerances        SOCIAL HISTORY: No illicit drug use    FAMILY HISTORY:  No pertinent family history in first degree relatives      Vital Signs Last 24 Hrs  T(C): 35.4 (04 Dec 2018 15:00), Max: 35.8 (03 Dec 2018 19:08)  T(F): 95.7 (04 Dec 2018 15:00), Max: 96.5 (03 Dec 2018 19:08)  HR: 102 (04 Dec 2018 16:25) (83 - 102)  BP: 134/60 (04 Dec 2018 16:25) (99/54 - 136/64)  BP(mean): 86 (04 Dec 2018 16:25) (66 - 92)  RR: 28 (04 Dec 2018 17:00) (12 - 28)  SpO2: 100% (04 Dec 2018 17:00) (96% - 100%)    PHYSICAL EXAM:    Constitutional: NAD, well-developed  HEENT: EOMI  Neck: no pain  Back: No CVA tenderness  Respiratory: No accessory respiratory muscle use  Abd: Soft, NT/ND  no organomegally  no hernia  : Foreskin with very dense significant edema, glans penis very deep and unable to be easily accessed. +meatal stenosis from the very limited exam  no sign of brad's   Extremities: no edema  Neurological: A/O x 3  Psychiatric: Normal mood, normal affect  Skin: No rashes    I&O's Summary    03 Dec 2018 07:01  -  04 Dec 2018 07:00  --------------------------------------------------------  IN: 990 mL / OUT: 0 mL / NET: 990 mL    04 Dec 2018 07:01  -  04 Dec 2018 18:40  --------------------------------------------------------  IN: 1100 mL / OUT: 100 mL / NET: 1000 mL        LABS:                        8.1    10.90 )-----------( 80       ( 04 Dec 2018 06:23 )             24.4     12-04    121<L>  |  86<L>  |  44<H>  ----------------------------<  123<H>  4.7   |  19  |  4.4<HH>    Ca    6.7<L>      04 Dec 2018 06:23  Mg     1.7     12-03    TPro  6.8  /  Alb  1.5<L>  /  TBili  5.5<H>  /  DBili  3.6<H>  /  AST  290<H>  /  ALT  74<H>  /  AlkPhos  96  12-04    PT/INR - ( 04 Dec 2018 18:00 )   PT: 26.60 sec;   INR: 2.42 ratio         PTT - ( 04 Dec 2018 18:00 )  PTT:45.4 sec    Urine Culture:         RADIOLOGY & ADDITIONAL STUDIES:  < from: US Retroperitoneal Complete (12.03.18 @ 17:00) >  IMPRESSION:    Markedly limited exam demonstrating no evidence of bilateral   hydronephrosis.    Again seen is large abdominal ascites.              AIKRA PAULSON M.D., RESIDENT RADIOLOGIST  This document has been electronically signed.  PEDRO FELDER M.D., ATTENDING RADIOLOGIST  This document has been electronically signed. Dec  3 2018  5:19PM              < end of copied text >

## 2018-12-04 NOTE — PROGRESS NOTE ADULT - ASSESSMENT
BREANNA ENRIQUE 58y Male  MRN#: [id number]   CODE STATUS: FULL CODE    SUBJECTIVE  58 year old male with known hx of Cirrhosis (due to Hep C and ETOH abuse), heroin abuse, chronic back pain, and morbid obesity presented to the ED for AMS after being found by his neighbors on the floor of his home covered in feces. Admitted for Sepsis secondary to spontaneous bacterial peritonitis. Hospital stay complicated by severe agitation, renal failure requiring dialysis, and bacteremia. Today is hospital day 3d, and this morning he is agitation and RN reports agitation overnight.     Present Today:           Zavala Catheter (x)No/ ()Yes? Indication:          Central Line ()No/ (x)Yes? Indication: critically ill          IV Fluids (x)No/ ()Yes? Type:  Rate:  Indication:    OBJECTIVE  PAST MEDICAL & SURGICAL HISTORY  Heroin abuse  Hepatitis C virus infection without hepatic coma, unspecified chronicity  ETOH abuse  Alcoholic cirrhosis of liver with ascites  No significant past surgical history    ALLERGIES:  No Known Allergies    MEDICATIONS:  STANDING MEDICATIONS  ALBUTerol/ipratropium for Nebulization 3 milliLiter(s) Nebulizer every 6 hours  cefTRIAXone   IVPB      cefTRIAXone   IVPB 2 Gram(s) IV Intermittent every 24 hours  folic acid 1 milliGRAM(s) Oral daily  hydrocortisone sodium succinate Injectable 100 milliGRAM(s) IV Push every 6 hours  norepinephrine Infusion 0.05 MICROgram(s)/kG/Min IV Continuous <Continuous>  pantoprazole    Tablet 40 milliGRAM(s) Oral before breakfast  thiamine 100 milliGRAM(s) Oral daily    PRN MEDICATIONS  dextrose 50% Injectable 50 milliLiter(s) IV Push every 2 hours PRN  lactulose Syrup 20 Gram(s) Oral three times a day PRN    VITAL SIGNS: Last 24 Hours  T(F): 96.3 (04 Dec 2018 03:07), Max: 96.7 (03 Dec 2018 15:05)  HR: 94 (04 Dec 2018 08:47) (83 - 100)  BP: 107/59 (04 Dec 2018 08:47) (99/54 - 136/59)  BP(mean): 75 (04 Dec 2018 08:47) (66 - 87)  RR: 17 (04 Dec 2018 08:47) (12 - 20)  SpO2: 97% (04 Dec 2018 08:47) (96% - 100%)    LABS:                     8.1    10.90 )-----------( 80       ( 04 Dec 2018 06:23 )             24.4     121<L>  |  86<L>  |  44<H>  ----------------------------<  123<H>  4.7   |  19  |  4.4<HH>    Ca    6.7<L>      04 Dec 2018 06:23  Mg     1.7     12-03    TPro  6.8  /  Alb  1.5<L>  /  TBili  5.5<H>  /  DBili  3.6<H>  /  AST  290<H>  /  ALT  74<H>  /  AlkPhos  96  12-04    PT/INR - ( 04 Dec 2018 06:23 )   PT: 31.90 sec;   INR: 2.89 ratio    PTT - ( 04 Dec 2018 06:23 )  PTT:56.6 sec    Culture - Body Fluid with Gram Stain (collected 03 Dec 2018 14:19)  Source: .Body Fluid Peritoneal Fluid  Gram Stain (04 Dec 2018 02:26):    polymorphonuclear leukocytes seen per low power field    No organisms seen per oil power field    by cytocentrifuge    Culture - Blood (collected 02 Dec 2018 12:24)  Source: .Blood Blood  Gram Stain (03 Dec 2018 19:18):    Growth in anaerobic bottle: Gram Positive Cocci in Pairs and Chains  Preliminary Report (03 Dec 2018 19:18):    Growth in anaerobic bottle: Gram Positive Cocci in Pairs and Chains    "Due to technical problems, Proteus sp. will Not be reported as part of    the BCID panel until further notice"    ***Blood Panel PCR results on this specimen are available    approximately 3 hours after the Gram stain result.***    Gram stain, PCR, and/or culture results may not always    correspond due to difference in methodologies.    ************************************************************    This PCR assay was performed using [employer].    The following targets are tested for: Enterococcus,    vancomycin resistant enterococci, Listeria monocytogenes,    coagulase negative staphylococci, S. aureus,    methicillin resistant S. aureus, Streptococcus agalactiae    (Group B), S. pneumoniae, S. pyogenes (Group A),    Acinetobacter baumannii, Enterobacter cloacae, E. coli,    Klebsiella oxytoca, K. pneumoniae, Proteus sp.,    Serratia marcescens, Haemophilus influenzae,    Neisseria meningitidis, Pseudomonas aeruginosa, Candida    albicans, C. glabrata, C krusei, C parapsilosis,    C. tropicalis and the KPC resistance gene.  Organism: Blood Culture PCR (03 Dec 2018 20:55)  Organism: Blood Culture PCR (03 Dec 2018 20:55)    Culture - Blood (collected 02 Dec 2018 12:24)  Source: .Blood Blood  Gram Stain (04 Dec 2018 01:58):    Growth in anaerobic bottle: Gram Positive Cocci in Pairs and Chains  Preliminary Report (04 Dec 2018 01:59):    Growth in anaerobic bottle: Gram Positive Cocci in Pairs and Chains    CARDIAC MARKERS ( 04 Dec 2018 06:23 )  x     / x     / 3595 U/L / x     / x      CARDIAC MARKERS ( 03 Dec 2018 06:35 )  x     / <0.01 ng/mL / 6083 U/L / x     / 78.0 ng/mL  CARDIAC MARKERS ( 02 Dec 2018 12:24 )  x     / <0.01 ng/mL / 1132 U/L / x     / 26.1 ng/mL    RADIOLOGY:  Xray Chest 1 View- PORTABLE-Routine (12.04.18 @ 04:40)  1. Vascular crowding secondary to low lung volumes versus vascular congestion.   2. No focal parenchymal opacities or pleural effusions.    US Retroperitoneal Complete (12.03.18 @ 17:00)  1. Collapsed urinary bladder.   2. Markedly limited exam demonstrating no evidence of bilateral hydronephrosis.  3. Again seen is large abdominal ascites.    PHYSICAL EXAM:  GENERAL: AAOx1-2, obese, poorly functional  HEENT:  Atraumatic, Normocephalic. EOMI, PERRLA, scleral icterus  PULMONARY: Decreased air entry bilaterally  CARDIOVASCULAR: Regular rate and rhythm; No murmurs, rubs, or gallops  GASTROINTESTINAL: Distended and tense, with large umbilical hernia  MUSCULOSKELETAL:  Diffuse edema pitting throughout all extremities  NEUROLOGY: non-focal, positive for asterixis   : Retracted Penis, unable to assess or find urethral meatus    ADMISSION SUMMARY  58 year old male with known hx of Cirrhosis (due to Hep C and ETOH abuse), heroin abuse, chronic back pain, and morbid obesity presented to the ED for AMS after being found by his neighbors on the floor of his home covered in feces. Admitted for Sepsis secondary to spontaneous bacterial peritonitis. Hospital stay complicated by severe agitation, renal failure requiring dialysis, and bacteremia.    ASSESSMENT & PLAN  1.  Altered Mental Status suspected due to sepsis secondary to SBP complicated by bacteremia and Supra-therapeutic INR with known hx of ETOH and Heroin abuse, Hep C, and Cirrhosis  - Pulm and GI following  - ID and Neuro consults appreciated  - Diagnostic Paracentesis done with results positive for SBP  - Continue rocephin, add Vanco  - Continue EEG  - Pan culture, Blood culture positive for Gram positive cocci in chains and pairs  - Lactulose to titrate to 1-2bm's daily if patient wakes up  - Thiamine, folic acid  - Utox pending  - Check Echo to rule out endocarditis  - Decrease hydrocortisone to q8h  - Give albumin 1gm/kg today to complete SBP treatment    2. Severe LORENZO   - SCr around 4, from baseline 1.5, worsening  - Keep I=O  - Place zavala catheter, Urology consult open for help with zavala placement  - Hold fluids and diuretics  - Renal follow up  - Check urine lytes  - Will need Udoll placement today and plan for starting HD either this evening or tomorrow morning    3. Hyponatremia - suspect hypervolemic (dilutional)  - Check Serum and Urine Osm  - Nephrology follow up    4. Thiamine Deficiency, suspected - continue thiamine supplementation    5. DVT Prophylaxis - ICDs, hold pharmacologic prophylaxis due to coagulopathy     Present today:  ( ) Congestive Heart Failure, Yes? ( )Acute / ( )Acute on Chronic / ( )Chronic  :  ( )Systolic / ( )Diastolic               Plan:  ( ) Complicated Pneumonia, Type?  ( )Parapneumonic effusion / ( )Abscess / ( ) Multilobar / ( )Other               Plan:  ( ) Morbid Obesity, Yes? BMI:               Plan:  ( ) Functional Quadriplegia               Plan:  (x) Encephalopathy               Plan: suspect uremia v. sepsis as cause, start HD, continue antibiotics, and EEG to rule out seizures    (x) Discussion with patient and/or family regarding goals of care  (x) Discussed Case and Plan with Medical Attending    # Planned Disposition: Pending

## 2018-12-04 NOTE — CONSULT NOTE ADULT - SUBJECTIVE AND OBJECTIVE BOX
GENERAL SURGERY CONSULT NOTE    Patient: BREANNA ENRIQUE , 58y ([birth year])Male   MRN: [id number]  Location: [id number]  Visit: 12-02-18 Inpatient  Date: 12-04-18 @ 17:29      HPI:  58 year old male with known hx of Cirrhosis (due to Hep C and ETOH abuse), heroin abuse, chronic back pain, and morbid obesity presented to the ED for AMS after being found by his neighbors on the floor of his home covered in feces. Brought in by EMS, FS found to be 38, patient hypotensive with BP 84/50, and hypothermic (93.9F rectal temp). During that time of examination patient was lethargic and not answering questions appropriately or following commands. Hospital stay complicated by severe agitation and yelling, requiring ativan overnight leading to worsening lethargy    Surgery called for UDALL Catheter placement for hemodialysis    PMHx:  Heroin abuse  Hepatitis C virus infection without hepatic coma, unspecified chronicity  ETOH abuse  Alcoholic cirrhosis of liver with ascites    PSHx:  Paracentesis at bedside    No significant past surgical history    Home Medications:  DAILY-ALICJA   TAB:   FUROSEMIDE   TAB 40MG:   OXYCODONE    TAB 30MG:   PANTOPRAZOLE TAB 40MG:   SMZ/TMP DS   -160:   SPIRONOLACT  TAB 50MG:     Allergies:  No Known Allergies    Vital Signs Last 24 Hrs  T(F): 95.7 (04 Dec 2018 15:00), Max: 96.5 (03 Dec 2018 19:08)  HR: 90 (04 Dec 2018 14:15) (83 - 100)  BP: 133/59 (04 Dec 2018 14:15) (99/54 - 136/59)  BP(mean): 85 (04 Dec 2018 14:15) (66 - 87)  RR: 28 (04 Dec 2018 17:00) (13 - 28)  SpO2: 100% (04 Dec 2018 17:00) (96% - 100%)    PHYSICAL EXAM:  GENERAL: AAOx1-2, obese, poorly functional, Diffuse anasarca  ABD: Distended , with large umbilical hernia.     MEDICATIONS  (STANDING):  ALBUTerol/ipratropium for Nebulization 3 milliLiter(s) Nebulizer every 6 hours  cefTRIAXone   IVPB 2 Gram(s) IV Intermittent every 12 hours  folic acid 1 milliGRAM(s) Oral daily  hydrocortisone sodium succinate Injectable 100 milliGRAM(s) IV Push every 8 hours  lactulose Syrup 20 Gram(s) Oral every 2 hours  pantoprazole    Tablet 40 milliGRAM(s) Oral before breakfast  rifaximin 550 milliGRAM(s) Oral two times a day  thiamine 100 milliGRAM(s) Oral daily    MEDICATIONS  (PRN):  dextrose 50% Injectable 50 milliLiter(s) IV Push every 2 hours PRN if fingerstick <70    LAB/STUDIES:                        8.1    10.90 )-----------( 80       ( 04 Dec 2018 06:23 )             24.4     12-04    121<L>  |  86<L>  |  44<H>  ----------------------------<  123<H>  4.7   |  19  |  4.4<HH>    Ca    6.7<L>      04 Dec 2018 06:23  Mg     1.7     12-03    TPro  6.8  /  Alb  1.5<L>  /  TBili  5.5<H>  /  DBili  3.6<H>  /  AST  290<H>  /  ALT  74<H>  /  AlkPhos  96  12-04    PT/INR - ( 04 Dec 2018 06:23 )   PT: 31.90 sec;   INR: 2.89 ratio      PTT - ( 04 Dec 2018 06:23 )  PTT:56.6 sec  LIVER FUNCTIONS - ( 04 Dec 2018 06:23 )  Alb: 1.5 g/dL / Pro: 6.8 g/dL / ALK PHOS: 96 U/L / ALT: 74 U/L / AST: 290 U/L / GGT: x             CARDIAC MARKERS ( 04 Dec 2018 06:23 )  x     / x     / 3595 U/L / x     / x      CARDIAC MARKERS ( 03 Dec 2018 06:35 )  x     / <0.01 ng/mL / 6083 U/L / x     / 78.0 ng/mL    Culture - Body Fluid with Gram Stain (collected 03 Dec 2018 14:19)  Source: .Body Fluid Peritoneal Fluid  Gram Stain (04 Dec 2018 02:26):    polymorphonuclear leukocytes seen per low power field    No organisms seen per oil power field    by cytocentrifuge    Culture - Blood (collected 02 Dec 2018 12:24)  Source: .Blood Blood  Gram Stain (03 Dec 2018 19:18):    Growth in anaerobic bottle: Gram Positive Cocci in Pairs and Chains  Preliminary Report (03 Dec 2018 19:18):    Growth in anaerobic bottle: Gram Positive Cocci in Pairs and Chains    "Due to technical problems, Proteus sp. will Not be reported as part of    the BCID panel until further notice"    ***Blood Panel PCR results on this specimen are available    approximately 3 hours after the Gram stain result.***    Gram stain, PCR, and/or culture results may not always    correspond due to difference in methodologies.    ************************************************************    This PCR assay was performed using [employer].    The following targets are tested for: Enterococcus,    vancomycin resistant enterococci, Listeria monocytogenes,    coagulase negative staphylococci, S. aureus,    methicillin resistant S. aureus, Streptococcus agalactiae    (Group B), S. pneumoniae, S. pyogenes (Group A),    Acinetobacter baumannii, Enterobacter cloacae, E. coli,    Klebsiella oxytoca, K. pneumoniae, Proteus sp.,    Serratia marcescens, Haemophilus influenzae,    Neisseria meningitidis, Pseudomonas aeruginosa, Candida    albicans, C. glabrata, C krusei, C parapsilosis,    C. tropicalis and the KPC resistance gene.  Organism: Blood Culture PCR (03 Dec 2018 20:55)  Organism: Blood Culture PCR (03 Dec 2018 20:55)    Culture - Blood (collected 02 Dec 2018 12:24)  Source: .Blood Blood  Gram Stain (04 Dec 2018 01:58):    Growth in anaerobic bottle: Gram Positive Cocci in Pairs and Chains  Preliminary Report (04 Dec 2018 01:59):    Growth in anaerobic bottle: Gram Positive Cocci in Pairs and Chains      ASSESSMENT:  58yM w/ PMHx of Ascites, liver cirrhosis, history of Hepatitis C, and ETOH abuse, hepatic encephalopathy, SBP, bacteremia, and hyperkalemia as well as severe renal insufficiency. In need of UDALL catheter placement  for Urgent HD due to worsening fluid overload, encephalopathy and worsening azotemia    PLAN:    - s/p UDALL Cath placement   - May start start using for HD

## 2018-12-04 NOTE — PROGRESS NOTE ADULT - SUBJECTIVE AND OBJECTIVE BOX
NEPHROLOGY PROGRESS NOTE    pt seen and examined in icu  critically ill  sally unable to be placed  renal function worse  still lethargic  off pressors  + blood cultures    PAST MEDICAL & SURGICAL HISTORY:    Allergies:  No Known Allergies    Home Medications Reviewed    SOCIAL HISTORY:  Denies ETOH,Smoking,   FAMILY HISTORY:        REVIEW OF SYSTEMS:  unobtainable    PHYSICAL EXAM:  sedated  jaundiced  obese  cta b/l  rrr  distention  + anasarca  groin TLC  no [facility] Medications:   MEDICATIONS  (STANDING):  ALBUTerol/ipratropium for Nebulization 3 milliLiter(s) Nebulizer every 6 hours  cefTRIAXone   IVPB      cefTRIAXone   IVPB 2 Gram(s) IV Intermittent every 24 hours  folic acid 1 milliGRAM(s) Oral daily  hydrocortisone sodium succinate Injectable 100 milliGRAM(s) IV Push every 6 hours  norepinephrine Infusion 0.05 MICROgram(s)/kG/Min (6.577 mL/Hr) IV Continuous <Continuous>  pantoprazole    Tablet 40 milliGRAM(s) Oral before breakfast  thiamine 100 milliGRAM(s) Oral daily        VITALS:  T(F): 96.3 (12-04-18 @ 03:07), Max: 96.7 (12-03-18 @ 15:05)  HR: 97 (12-04-18 @ 05:48)  BP: 136/59 (12-04-18 @ 05:48)  RR: 20 (12-04-18 @ 05:07)  SpO2: 96% (12-04-18 @ 05:07)  Wt(kg): --    12-02 @ 07:01  -  12-03 @ 07:00  --------------------------------------------------------  IN: 2114 mL / OUT: 0 mL / NET: 2114 mL    12-03 @ 07:01  -  12-04 @ 07:00  --------------------------------------------------------  IN: 990 mL / OUT: 0 mL / NET: 990 mL          LABS:  12-04    121<L>  |  86<L>  |  44<H>  ----------------------------<  123<H>  4.7   |  19  |  4.4<HH>    Ca    6.7<L>      04 Dec 2018 06:23  Mg     1.7     12-03    TPro  6.8  /  Alb  1.5<L>  /  TBili  5.5<H>  /  DBili  3.6<H>  /  AST  290<H>  /  ALT  74<H>  /  AlkPhos  96  12-04                          8.1    10.90 )-----------( 80       ( 04 Dec 2018 06:23 )             24.4       Urine Studies:        RADIOLOGY & ADDITIONAL STUDIES:

## 2018-12-04 NOTE — PROGRESS NOTE ADULT - ASSESSMENT
LORENZO    - renal function worsening   - nl baseline renal fx   - no hydro on ct/us   - incontinent uop   - possible ATN / r/o: abd compartment syndrome / HRS - no urine studies or abd pressures available to differentiate  hypervolemic hyponatremia  anasarca / ascites  GPC bacteremia      plan:    would initiate dialysis for fluid overload, encephalopathy and worsening azotemia  surgery consult for non-tunneled dialysis catheter placement   f/u for zavala  vanco / rocephin adjust to GFR  f/u blood cultures  please send urine studies once able to collect urine  can call me on my cell for any questions [phone]

## 2018-12-04 NOTE — CONSULT NOTE ADULT - ATTENDING COMMENTS
Agree with above  ICU had asked for zavala catheter to provide urine output measurements  despite multiple attempts including with  wires, we were unable to place the zavala catheter  on multiple images reviewed by me ,there was evidence that the bladder was decompressed  patient can have nephrostomy bag attached to groin for now -- the severe edema would not allow for a condom catheter

## 2018-12-04 NOTE — CONSULT NOTE ADULT - SUBJECTIVE AND OBJECTIVE BOX
Neurology Consultation note    Name  BREANNA ENRIQUE    HPI:  58 year old male with known hx of Cirrhosis (due to Hep C and ETOH abuse), heroin abuse, chronic back pain, and morbid obesity presented to the ED for AMS after being found by his neighbors on the floor of his home covered in feces. Brought in by EMS, FS found to be 38, patient hypotensive with BP 84/50, and hypothermic (93.9F rectal temp). During that time of examination patient was lethargic and not answering questions appropriately or following commands. Hospital stay complicated by severe agitation and yelling, requiring ativan overnight leading to worsening lethargy. (03 Dec 2018 14:17)    patient in renal failure   patient with profound hyponatremia and also septic  patient cannot give hx      Vital Signs Last 24 Hrs  T(C): 35.7 (04 Dec 2018 03:07), Max: 35.9 (03 Dec 2018 15:05)  T(F): 96.3 (04 Dec 2018 03:07), Max: 96.7 (03 Dec 2018 15:05)  HR: 94 (04 Dec 2018 08:47) (83 - 100)  BP: 107/59 (04 Dec 2018 08:47) (99/54 - 136/59)  BP(mean): 75 (04 Dec 2018 08:47) (66 - 87)  RR: 17 (04 Dec 2018 08:47) (12 - 20)  SpO2: 97% (04 Dec 2018 08:47) (96% - 100%)    Neurological Exam:   ms : arouses to voice, minimal garbled speech output  not following commannds  agitated  cr nn grossly intact    Medications  albumin human 25% IVPB 140 Gram(s) IV Intermittent once  ALBUTerol/ipratropium for Nebulization 3 milliLiter(s) Nebulizer every 6 hours  cefTRIAXone   IVPB 2 Gram(s) IV Intermittent every 12 hours  dextrose 50% Injectable 50 milliLiter(s) IV Push every 2 hours PRN  folic acid 1 milliGRAM(s) Oral daily  hydrocortisone sodium succinate Injectable 100 milliGRAM(s) IV Push every 8 hours  lactulose Syrup 20 Gram(s) Oral every 2 hours  pantoprazole    Tablet 40 milliGRAM(s) Oral before breakfast  rifaximin 550 milliGRAM(s) Oral two times a day  thiamine 100 milliGRAM(s) Oral daily      Lab  12-04    121<L>  |  86<L>  |  44<H>  ----------------------------<  123<H>  4.7   |  19  |  4.4<HH>    Ca    6.7<L>      04 Dec 2018 06:23  Mg     1.7     12-03    TPro  6.8  /  Alb  1.5<L>  /  TBili  5.5<H>  /  DBili  3.6<H>  /  AST  290<H>  /  ALT  74<H>  /  AlkPhos  96  12-04                          8.1    10.90 )-----------( 80       ( 04 Dec 2018 06:23 )             24.4     LIVER FUNCTIONS - ( 04 Dec 2018 06:23 )  Alb: 1.5 g/dL / Pro: 6.8 g/dL / ALK PHOS: 96 U/L / ALT: 74 U/L / AST: 290 U/L / GGT: x                   Radiology      Assessment:  59 yo man with multiple active medical issues with ams  likely secondary to TME  Plan:  cont med management  EEG diffusely slow c/w a metabolic encepahlopathy  please call with any questions or if no improvement after medical optimization

## 2018-12-04 NOTE — PROGRESS NOTE ADULT - ASSESSMENT
58y yo Male with ascites history of hepatitis C unsure if treated, ETOH abuse, Liver Cirrhosis Impression: 58y yo Male with Ascites, liver cirrhosis, history of Hepatitis C. No asterixis noted on exam most likely not hepatic encephalopathy.   MELD Score 39   Child Cr[place]tree Class C    1) Ascites  - Start albumin infusion  - continue antibiotics.  - No large volume paracentesis until HRS ruled out    2) Elevated Creatinine  Last creatinine prior to admission in October 2017 was normal  -check urine lytes, urine sodium, urine urea to determine if this is prerenal  - appreciate renal input  - I+O  - Daily lytes  - If this is HRS patient will need albumin infusion  to be added to his levophed    3) anemia  - no evidence of GI bleeding  - continue to monitor hgb and stool output  - EGD as outpatient if no overt bleeding in hospital    4) hx of hcv  - please check HCV RNA (qualitative) to determine if patient still has HCV  Recommendation:  Check Hep C Viral Load/ Hep C RNA Qualitative    Continue Antibiotics     5) Altered mental status  - Patient has asterixis Impression: 58y yo Male with Ascites, liver cirrhosis, history of Hepatitis C, and ETOH abuse, hepatic encephalopathy, SBP, bacteremia, and hyperkalemia as well as severe renal insufficiency.  MELD Score 39   Child [place]tree Class C    1) Ascites/SBP  - Started albumin infusion  - Started on Ceftriaxone yesterday and Vancomycin today based on blood culture results which show GPC.  - Spoke with Unit resident who has been in touch with nephrology and they plan to remove a significant amount of fluid at the time of HD later today.    2) Elevated BUN/Creatinine, worsening over time.  - Nephrology consult obtained and dialysis is planned.  Correct K+ which is elevated.  - Albumin being given and unit resident will ask Nephrology whether they would want to add Midodrine.    3) Anemia  - no evidence of GI bleeding  - continue to monitor hgb and stool output  - Transfuse as needed to a Hb > 8 if needed.    4) hx of HCV  - please check HCV RNA (qualitative) to determine if patient still has HCV  Recommendation:  Check HCV genotype.    5)  Diet:  Pt is currently somnolent so I would hold diet until patient's MS has improved.  Once MS has improved would place patient on a 2 gm Na+ renal diet.   Continue Antibiotics     6) Altered mental status  - Patient has asterixis, is somnolent and is not oriented to time, place or person.  - Start Lactulose q 2 hrs and titrate to 3 soft BMs per day.  - Consider the use of Rifaximin once MS has improved enough so he can take po meds.

## 2018-12-04 NOTE — PROGRESS NOTE ADULT - SUBJECTIVE AND OBJECTIVE BOX
Preceding events noted  Spoke to house staff and ICU team  Pt more awake and communicative  Icteric  Dr Moreau consult noted  Starting HD today  Culture (+) Gram (+) cocci peritneal fluid      ENT:  No difficulty hearing, tinnitus, vertigo; No sinus or throat pain  Neck: No pain or stiffness  Respiratory: No cough, wheezing, chills or hemoptysis  Cardiovascular: No chest pain, palpitations, shortness of breath, dizziness or leg swelling  Gastrointestinal: No abdominal or epigastric pain. No nausea, vomiting or hematemesis; No diarrhea or constipation. No melena or hematochezia.  Neurological: No headaches, memory loss, loss of strength, numbness or tremors  Musculoskeletal: (++) joint pain or(++) swelling; No muscle, back or extremity pain  Psychiatric: No depression, anxiety, mood swings or difficulty sleeping    MEDICATIONS  (STANDING):  ALBUTerol/ipratropium for Nebulization 3 milliLiter(s) Nebulizer every 6 hours  cefTRIAXone   IVPB      cefTRIAXone   IVPB 2 Gram(s) IV Intermittent every 24 hours  folic acid 1 milliGRAM(s) Oral daily  hydrocortisone sodium succinate Injectable 100 milliGRAM(s) IV Push every 6 hours  norepinephrine Infusion 0.05 MICROgram(s)/kG/Min (6.577 mL/Hr) IV Continuous <Continuous>  pantoprazole    Tablet 40 milliGRAM(s) Oral before breakfast  thiamine 100 milliGRAM(s) Oral daily    MEDICATIONS  (PRN):  dextrose 50% Injectable 50 milliLiter(s) IV Push every 2 hours PRN if fingerstick <70  lactulose Syrup 20 Gram(s) Oral three times a day PRN Constipation, titrate to 1-2 BM's daily      Vital Signs Last 24 Hrs  T(C): 35.7 (04 Dec 2018 03:07), Max: 35.9 (03 Dec 2018 15:05)  T(F): 96.3 (04 Dec 2018 03:07), Max: 96.7 (03 Dec 2018 15:05)  HR: 94 (04 Dec 2018 08:47) (83 - 100)  BP: 107/59 (04 Dec 2018 08:47) (99/54 - 136/59)  BP(mean): 75 (04 Dec 2018 08:47) (66 - 87)  RR: 17 (04 Dec 2018 08:47) (12 - 20)  SpO2: 97% (04 Dec 2018 08:47) (96% - 100%)    PHYSICAL EXAM:      HEENT: PERRLA, EOMI, Normal Hearing, MMM  Neck: No LAD, No JVD  Back: Normal spine flexure, No CVA tenderness  Respiratory: CTAB/L  Cardiovascular: S1 and S2, RRR, no M/G/R  Gastrointestinal: BS+, soft, NT/ND  Ascitis (++)  Extremities: (++) peripheral edema  Vascular: (-) peripheral pulses  Neurological: , no focal deficits  : ?phimosis  LABS:                        8.1    10.90 )-----------( 80       ( 04 Dec 2018 06:23 )             24.4     12-04    121<L>  |  86<L>  |  44<H>  ----------------------------<  123<H>  4.7   |  19  |  4.4<HH>    Ca    6.7<L>      04 Dec 2018 06:23  Mg     1.7     12-03    TPro  6.8  /  Alb  1.5<L>  /  TBili  5.5<H>  /  DBili  3.6<H>  /  AST  290<H>  /  ALT  74<H>  /  AlkPhos  96  12-04    PT/INR - ( 04 Dec 2018 06:23 )   PT: 31.90 sec;   INR: 2.89 ratio         PTT - ( 04 Dec 2018 06:23 )  PTT:56.6 sec    Drug Screen Urine:  Alcohol Level  Alcohol, Blood: <10 mg/dL (12-02-18 @ 12:24)        RADIOLOGY & ADDITIONAL STUDIES:  no new imaging

## 2018-12-04 NOTE — PROGRESS NOTE ADULT - SUBJECTIVE AND OBJECTIVE BOX
ICU Attending note   Patient was found to have blood culture positive last night- called by the lab. Preliminary report showed gram positive cocci. 1 dose of IV Vancomycin given last night , in case it is MRSA . To be followed by ID.

## 2018-12-04 NOTE — CONSULT NOTE ADULT - CONSULT REASON
AMS
Altered mental status hypotension
Fitzpatrick Insertion
Liver Cirrhosis
SBP
ams
terrence
UDALL Catheter placement

## 2018-12-04 NOTE — CONSULT NOTE ADULT - ASSESSMENT
IMPRESSION  Severe Sepsis (temp<96.8F, pulse>90 beats/min, wbc>12,  lactic acidosis, acute kidney injury, metabolic encephalopathy) due to Streptococcus sp bacteremia  R/O Spontaneous bacterial peritonitis in pt with cirrhosis (ETOH & hepC)    Pt with hyponatremia, thrombocytopenia    On cefTRIAXone   IVPB 2 Gram(s) IV Intermittent every 24 hours    CT with atelectasis, pleural effusions, ascites and cirrhosis    SUGGESTIONs  Await peritoneal fluid culture  Continue Rocephin   Repeat blood cultures x2  Repeat sodium, platelets

## 2018-12-04 NOTE — PROGRESS NOTE ADULT - SUBJECTIVE AND OBJECTIVE BOX
Patient is a 58y old  Male who presents with a chief complaint of Altered Mental Status (04 Dec 2018 08:49)    OVER NIGHT EVENTS:  More awake still not well oriented;   S/p Diagnostic para;     PHYSICAL EXAM    ICU Vital Signs Last 24 Hrs  T(C): 35.7 (04 Dec 2018 03:07), Max: 35.9 (03 Dec 2018 15:05)  T(F): 96.3 (04 Dec 2018 03:07), Max: 96.7 (03 Dec 2018 15:05)  HR: 94 (04 Dec 2018 08:47) (83 - 100)  BP: 107/59 (04 Dec 2018 08:47) (99/54 - 136/59)  BP(mean): 75 (04 Dec 2018 08:47) (66 - 87)  RR: 17 (04 Dec 2018 08:47) (12 - 20)  SpO2: 97% (04 Dec 2018 08:47) (96% - 100%)    I&O's Detail    03 Dec 2018 07:01  -  04 Dec 2018 07:00  --------------------------------------------------------  IN:    dextrose 5% + sodium chloride 0.9%: 700 mL    IV PiggyBack: 250 mL    norepinephrine Infusion: 40 mL  Total IN: 990 mL    OUT:  Total OUT: 0 mL    Total NET: 990 mL    General: Comfortable in bed  HEENT:  On RA; icteric sclera;   Lymph node: No palpable LN             Lungs: CTA  Cardiovascular: RRR, S1S2  Abdomen: Distended abdomen  Extremities: Bilateral LE edema  Skin:  No evident Rash  Neurological:  No focal deficit      LABS:                        8.1    10.90 )-----------( 80       ( 04 Dec 2018 06:23 )             24.4   12-04    121<L>  |  86<L>  |  44<H>  ----------------------------<  123<H>  4.7   |  19  |  4.4<HH>    Ca    6.7<L>      04 Dec 2018 06:23  Mg     1.7     12-03    TPro  6.8  /  Alb  1.5<L>  /  TBili  5.5<H>  /  DBili  3.6<H>  /  AST  290<H>  /  ALT  74<H>  /  AlkPhos  96  12-04    PT/INR - ( 04 Dec 2018 06:23 )   PT: 31.90 sec;   INR: 2.89 ratio    PTT - ( 04 Dec 2018 06:23 )  PTT:56.6 sec                                           CARDIAC MARKERS ( 04 Dec 2018 06:23 )  x     / x     / 3595 U/L / x     / x      CARDIAC MARKERS ( 03 Dec 2018 06:35 )  x     / <0.01 ng/mL / 6083 U/L / x     / 78.0 ng/mL  CARDIAC MARKERS ( 02 Dec 2018 12:24 )  x     / <0.01 ng/mL / 1132 U/L / x     / 26.1 ng/mL    LIVER FUNCTIONS - ( 04 Dec 2018 06:23 )  Alb: 1.5 g/dL / Pro: 6.8 g/dL / ALK PHOS: 96 U/L / ALT: 74 U/L / AST: 290 U/L / GGT: x                                 Lactate (12-02-18 @ 12:50): 5.4<H>    Culture - Body Fluid with Gram Stain (collected 03 Dec 2018 14:19)  Source: .Body Fluid Peritoneal Fluid  Gram Stain (04 Dec 2018 02:26):    polymorphonuclear leukocytes seen per low power field    No organisms seen per oil power field    by cytocentrifuge    Culture - Blood (collected 02 Dec 2018 12:24)  Source: .Blood Blood  Gram Stain (03 Dec 2018 19:18):    Growth in anaerobic bottle: Gram Positive Cocci in Pairs and Chains  Preliminary Report (03 Dec 2018 19:18):    Growth in anaerobic bottle: Gram Positive Cocci in Pairs and Chains    "Due to technical problems, Proteus sp. will Not be reported as part of    the BCID panel until further notice"    ***Blood Panel PCR results on this specimen are available    approximately 3 hours after the Gram stain result.***    Gram stain, PCR, and/or culture results may not always    correspond due to difference in methodologies.    ************************************************************    This PCR assay was performed using [employer].    The following targets are tested for: Enterococcus,    vancomycin resistant enterococci, Listeria monocytogenes,    coagulase negative staphylococci, S. aureus,    methicillin resistant S. aureus, Streptococcus agalactiae    (Group B), S. pneumoniae, S. pyogenes (Group A),    Acinetobacter baumannii, Enterobacter cloacae, E. coli,    Klebsiella oxytoca, K. pneumoniae, Proteus sp.,    Serratia marcescens, Haemophilus influenzae,    Neisseria meningitidis, Pseudomonas aeruginosa, Candida    albicans, C. glabrata, C krusei, C parapsilosis,    C. tropicalis and the KPC resistance gene.  Organism: Blood Culture PCR (03 Dec 2018 20:55)  Organism: Blood Culture PCR (03 Dec 2018 20:55)    Culture - Blood (collected 02 Dec 2018 12:24)  Source: .Blood Blood  Gram Stain (04 Dec 2018 01:58):    Growth in anaerobic bottle: Gram Positive Cocci in Pairs and Chains  Preliminary Report (04 Dec 2018 01:59):    Growth in anaerobic bottle: Gram Positive Cocci in Pairs and Chains    MEDICATIONS  (STANDING):  ALBUTerol/ipratropium for Nebulization 3 milliLiter(s) Nebulizer every 6 hours  cefTRIAXone   IVPB      cefTRIAXone   IVPB 2 Gram(s) IV Intermittent every 24 hours  folic acid 1 milliGRAM(s) Oral daily  hydrocortisone sodium succinate Injectable 100 milliGRAM(s) IV Push every 6 hours  norepinephrine Infusion 0.05 MICROgram(s)/kG/Min (6.577 mL/Hr) IV Continuous <Continuous>  pantoprazole    Tablet 40 milliGRAM(s) Oral before breakfast  thiamine 100 milliGRAM(s) Oral daily    MEDICATIONS  (PRN):  dextrose 50% Injectable 50 milliLiter(s) IV Push every 2 hours PRN if fingerstick <70  lactulose Syrup 20 Gram(s) Oral three times a day PRN Constipation, titrate to 1-2 BM's daily    Radiology:  < from: US Retroperitoneal Complete (12.03.18 @ 17:00) >  IMPRESSION:    Markedly limited exam demonstrating no evidence of bilateral   hydronephrosis.    Again seen is large abdominal ascites.    < end of copied text > Patient is a 58y old  Male who presents with a chief complaint of Altered Mental Status (04 Dec 2018 08:49)    OVER NIGHT EVENTS:  More awake still not well oriented; follow command   S/p Diagnostic para;     PHYSICAL EXAM    ICU Vital Signs Last 24 Hrs  T(C): 35.7 (04 Dec 2018 03:07), Max: 35.9 (03 Dec 2018 15:05)  T(F): 96.3 (04 Dec 2018 03:07), Max: 96.7 (03 Dec 2018 15:05)  HR: 94 (04 Dec 2018 08:47) (83 - 100)  BP: 107/59 (04 Dec 2018 08:47) (99/54 - 136/59)  BP(mean): 75 (04 Dec 2018 08:47) (66 - 87)  RR: 17 (04 Dec 2018 08:47) (12 - 20)  SpO2: 97% (04 Dec 2018 08:47) (96% - 100%)    I&O's Detail    03 Dec 2018 07:01  -  04 Dec 2018 07:00  --------------------------------------------------------  IN:    dextrose 5% + sodium chloride 0.9%: 700 mL    IV PiggyBack: 250 mL    norepinephrine Infusion: 40 mL  Total IN: 990 mL    OUT:  Total OUT: 0 mL    Total NET: 990 mL    General: Comfortable in bed  HEENT:  On RA; icteric sclera;   Lymph node: No palpable LN             Lungs: CTA  Cardiovascular: RRR, S1S2  Abdomen: Distended abdomen  Extremities: Bilateral LE edema  Skin:  No evident Rash  Neurological:  No focal deficit      LABS:                        8.1    10.90 )-----------( 80       ( 04 Dec 2018 06:23 )             24.4   12-04    121<L>  |  86<L>  |  44<H>  ----------------------------<  123<H>  4.7   |  19  |  4.4<HH>    Ca    6.7<L>      04 Dec 2018 06:23  Mg     1.7     12-03    TPro  6.8  /  Alb  1.5<L>  /  TBili  5.5<H>  /  DBili  3.6<H>  /  AST  290<H>  /  ALT  74<H>  /  AlkPhos  96  12-04    PT/INR - ( 04 Dec 2018 06:23 )   PT: 31.90 sec;   INR: 2.89 ratio    PTT - ( 04 Dec 2018 06:23 )  PTT:56.6 sec                                           CARDIAC MARKERS ( 04 Dec 2018 06:23 )  x     / x     / 3595 U/L / x     / x      CARDIAC MARKERS ( 03 Dec 2018 06:35 )  x     / <0.01 ng/mL / 6083 U/L / x     / 78.0 ng/mL  CARDIAC MARKERS ( 02 Dec 2018 12:24 )  x     / <0.01 ng/mL / 1132 U/L / x     / 26.1 ng/mL    LIVER FUNCTIONS - ( 04 Dec 2018 06:23 )  Alb: 1.5 g/dL / Pro: 6.8 g/dL / ALK PHOS: 96 U/L / ALT: 74 U/L / AST: 290 U/L / GGT: x                                 Lactate (12-02-18 @ 12:50): 5.4<H>    Culture - Body Fluid with Gram Stain (collected 03 Dec 2018 14:19)  Source: .Body Fluid Peritoneal Fluid  Gram Stain (04 Dec 2018 02:26):    polymorphonuclear leukocytes seen per low power field    No organisms seen per oil power field    by cytocentrifuge    Culture - Blood (collected 02 Dec 2018 12:24)  Source: .Blood Blood  Gram Stain (03 Dec 2018 19:18):    Growth in anaerobic bottle: Gram Positive Cocci in Pairs and Chains  Preliminary Report (03 Dec 2018 19:18):    Growth in anaerobic bottle: Gram Positive Cocci in Pairs and Chains    "Due to technical problems, Proteus sp. will Not be reported as part of    the BCID panel until further notice"    ***Blood Panel PCR results on this specimen are available    approximately 3 hours after the Gram stain result.***    Gram stain, PCR, and/or culture results may not always    correspond due to difference in methodologies.    ************************************************************    This PCR assay was performed using [employer].    The following targets are tested for: Enterococcus,    vancomycin resistant enterococci, Listeria monocytogenes,    coagulase negative staphylococci, S. aureus,    methicillin resistant S. aureus, Streptococcus agalactiae    (Group B), S. pneumoniae, S. pyogenes (Group A),    Acinetobacter baumannii, Enterobacter cloacae, E. coli,    Klebsiella oxytoca, K. pneumoniae, Proteus sp.,    Serratia marcescens, Haemophilus influenzae,    Neisseria meningitidis, Pseudomonas aeruginosa, Candida    albicans, C. glabrata, C krusei, C parapsilosis,    C. tropicalis and the KPC resistance gene.  Organism: Blood Culture PCR (03 Dec 2018 20:55)  Organism: Blood Culture PCR (03 Dec 2018 20:55)    Culture - Blood (collected 02 Dec 2018 12:24)  Source: .Blood Blood  Gram Stain (04 Dec 2018 01:58):    Growth in anaerobic bottle: Gram Positive Cocci in Pairs and Chains  Preliminary Report (04 Dec 2018 01:59):    Growth in anaerobic bottle: Gram Positive Cocci in Pairs and Chains    MEDICATIONS  (STANDING):  ALBUTerol/ipratropium for Nebulization 3 milliLiter(s) Nebulizer every 6 hours  cefTRIAXone   IVPB      cefTRIAXone   IVPB 2 Gram(s) IV Intermittent every 24 hours  folic acid 1 milliGRAM(s) Oral daily  hydrocortisone sodium succinate Injectable 100 milliGRAM(s) IV Push every 6 hours  norepinephrine Infusion 0.05 MICROgram(s)/kG/Min (6.577 mL/Hr) IV Continuous <Continuous>  pantoprazole    Tablet 40 milliGRAM(s) Oral before breakfast  thiamine 100 milliGRAM(s) Oral daily    MEDICATIONS  (PRN):  dextrose 50% Injectable 50 milliLiter(s) IV Push every 2 hours PRN if fingerstick <70  lactulose Syrup 20 Gram(s) Oral three times a day PRN Constipation, titrate to 1-2 BM's daily    Radiology:  < from: US Retroperitoneal Complete (12.03.18 @ 17:00) >  IMPRESSION:    Markedly limited exam demonstrating no evidence of bilateral   hydronephrosis.    Again seen is large abdominal ascites.    < end of copied text >

## 2018-12-04 NOTE — PROGRESS NOTE ADULT - ASSESSMENT
IMPRESSION:  AMS likely metabolic septic shock   Gram positive bacteremia;   SBP  LORENZO   Mild rhabdomyolysis- improved   Liver cirrhosis with failure    PLAN:    CNS: No sedatives; FU EEG; Thiamine folate; Utox     HEENT: Oral care;     PULMONARY: HOB at 45 degrees; Aspiration precautions     CARDIOVASCULAR: Keep I=<O; TTE    GI: GI prophylaxis. GI FU for possible transplant center referral; FU LFTs; Lactulose with target 1-2 BM /day;    RENAL: FU lytes; HD today as per renal; Surgery eval for [place]; Trend CK; Fitzpatrick uro eval; Urine Na U Osm; Serum OSM    INFECTIOUS DISEASE: Panculture; Keep on Rocephin; Add vanco; ID eval; Albumin 1 g/kg ( Day 3 SBP)     HEMATOLOGICAL:  DVT prophylaxis--> SCD;      ENDOCRINE:  Follow up FS.  Insulin protocol if needed. Decrease HC to q8; FU TSH    MICU monitoring for now IMPRESSION:  AMS likely metabolic septic shock   Gram positive bacteremia;   SBP  LORENZO   Mild rhabdomyolysis- improved   Liver cirrhosis with failure  ?? meningitis   PLAN:    CNS: No sedatives; FU EEG; Thiamine folate; Utox     HEENT: Oral care;     PULMONARY: HOB at 45 degrees; Aspiration precautions     CARDIOVASCULAR: Keep I=<O; TTE    GI: GI prophylaxis. GI FU for possible transplant center referral; FU LFTs; Lactulose with target 1-2 BM /day;    RENAL: FU lytes; HD today as per renal; Surgery eval for [place]; Trend CK; Fitzpatrick uro eval; Urine Na U Osm; Serum OSM    INFECTIOUS DISEASE: Panculture; Keep on Rocephin; Add vanco; ID eval; Albumin 1 g/kg ( Day 3 SBP)   BLd cx growing gram positive cocci in pairs will start treatment for possible meningitis increase rocephine Q 12 hrs   vanco follow trouph   ID consult   decadron , will try to attempt LP will reveres INR   HEMATOLOGICAL:  DVT prophylaxis--> SCD;      ENDOCRINE:  Follow up FS.  Insulin protocol if needed. Decrease HC to q8; FU TSH    MICU monitoring for now

## 2018-12-04 NOTE — PROGRESS NOTE ADULT - SUBJECTIVE AND OBJECTIVE BOX
58yMale  Being followed for   Interval history:      PMHX/PSHX: PAST MEDICAL & SURGICAL HISTORY:  Heroin abuse  Hepatitis C virus infection without hepatic coma, unspecified chronicity  ETOH abuse  Alcoholic cirrhosis of liver with ascites  No significant past surgical history        Social History: Unknown smoking Unknown illegal substance abuse. +ETOH abuse    MEDICATIONS:  MEDICATIONS  (STANDING):  ALBUTerol/ipratropium for Nebulization 3 milliLiter(s) Nebulizer every 6 hours  cefTRIAXone   IVPB      cefTRIAXone   IVPB 2 Gram(s) IV Intermittent every 24 hours  folic acid 1 milliGRAM(s) Oral daily  hydrocortisone sodium succinate Injectable 100 milliGRAM(s) IV Push every 6 hours  norepinephrine Infusion 0.05 MICROgram(s)/kG/Min (6.577 mL/Hr) IV Continuous <Continuous>  pantoprazole    Tablet 40 milliGRAM(s) Oral before breakfast  thiamine 100 milliGRAM(s) Oral daily    MEDICATIONS  (PRN):  dextrose 50% Injectable 50 milliLiter(s) IV Push every 2 hours PRN if fingerstick <70  lactulose Syrup 20 Gram(s) Oral three times a day PRN Constipation, titrate to 1-2 BM's daily      Allergies:  Allergies    No Known Allergies    Intolerances          REVIEW OF SYSTEMS:  Unobtainable       VITAL SIGNS:   T(F): 96.3 (12-04-18 @ 03:07), Max: 96.7 (12-03-18 @ 15:05)  HR: 94 (12-04-18 @ 08:47) (83 - 100)  BP: 107/59 (12-04-18 @ 08:47) (99/54 - 136/59)  RR: 17 (12-04-18 @ 08:47) (12 - 20)  SpO2: 97% (12-04-18 @ 08:47) (96% - 100%)      PHYSICAL EXAM  General: Morbidly Obese  HEENT: MMM, Normocephalic atraumatic, Icteric Sclera   Neck-No thyromegaly   Lungs: Clear, no Rhonchi  Heart-Normal s1/s2  Gastrointestinal: Largely Distended Rigid Abdomen. +Ascites +Bowel Sounds +umbilical hernia   Skin: Warm and dry.   LE- +anasarca +3 pitting edema  Neuro-+ asterixis           LABS:                        8.1    10.90 )-----------( 80       ( 04 Dec 2018 06:23 )             24.4     12-04    121<L>  |  86<L>  |  44<H>  ----------------------------<  123<H>  4.7   |  19  |  4.4<HH>    Ca    6.7<L>      04 Dec 2018 06:23  Mg     1.7     12-03    TPro  6.8  /  Alb  1.5<L>  /  TBili  5.5<H>  /  DBili  3.6<H>  /  AST  290<H>  /  ALT  74<H>  /  AlkPhos  96  12-04    LIVER FUNCTIONS - ( 04 Dec 2018 06:23 )  Alb: 1.5 g/dL / Pro: 6.8 g/dL / ALK PHOS: 96 U/L / ALT: 74 U/L / AST: 290 U/L / GGT: x           PT/INR - ( 04 Dec 2018 06:23 )   PT: 31.90 sec;   INR: 2.89 ratio         PTT - ( 04 Dec 2018 06:23 )  PTT:56.6 sec    IMAGING: 58yMale  Being followed for 58y yo Male with ascites history of hepatitis C unsure if treated, ETOH abuse, Liver Cirrhosis   Interval history: As per nursing team patient has not had a bowel movement last night. Patient is bacteremic diagnosed with SBP. Patient is currently on Vancomycin and Ceftriaxone. Patient is more alert today than yesterday but very confused. Patient knows he is in the hospital, knows his name but does not answer any other questions appropriately       PMHX/PSHX: PAST MEDICAL & SURGICAL HISTORY:  Heroin abuse  Hepatitis C virus infection without hepatic coma, unspecified chronicity  ETOH abuse  Alcoholic cirrhosis of liver with ascites  No significant past surgical history        Social History: Unknown smoking Unknown illegal substance abuse. +ETOH abuse    MEDICATIONS:  MEDICATIONS  (STANDING):  ALBUTerol/ipratropium for Nebulization 3 milliLiter(s) Nebulizer every 6 hours  cefTRIAXone   IVPB      cefTRIAXone   IVPB 2 Gram(s) IV Intermittent every 24 hours  folic acid 1 milliGRAM(s) Oral daily  hydrocortisone sodium succinate Injectable 100 milliGRAM(s) IV Push every 6 hours  norepinephrine Infusion 0.05 MICROgram(s)/kG/Min (6.577 mL/Hr) IV Continuous <Continuous>  pantoprazole    Tablet 40 milliGRAM(s) Oral before breakfast  thiamine 100 milliGRAM(s) Oral daily    MEDICATIONS  (PRN):  dextrose 50% Injectable 50 milliLiter(s) IV Push every 2 hours PRN if fingerstick <70  lactulose Syrup 20 Gram(s) Oral three times a day PRN Constipation, titrate to 1-2 BM's daily      Allergies:  Allergies    No Known Allergies    Intolerances          REVIEW OF SYSTEMS:  Unobtainable       VITAL SIGNS:   T(F): 96.3 (12-04-18 @ 03:07), Max: 96.7 (12-03-18 @ 15:05)  HR: 94 (12-04-18 @ 08:47) (83 - 100)  BP: 107/59 (12-04-18 @ 08:47) (99/54 - 136/59)  RR: 17 (12-04-18 @ 08:47) (12 - 20)  SpO2: 97% (12-04-18 @ 08:47) (96% - 100%)      PHYSICAL EXAM  General: Morbidly Obese  HEENT: MMM, Normocephalic atraumatic, Icteric Sclera   Neck-No thyromegaly   Lungs: Clear, no Rhonchi  Heart-Normal s1/s2  Gastrointestinal: Largely Distended Rigid Abdomen. +Ascites +Bowel Sounds +umbilical hernia   Skin: Warm and dry.   LE- +anasarca +3 pitting edema  Neuro-+ asterixis           LABS:                        8.1    10.90 )-----------( 80       ( 04 Dec 2018 06:23 )             24.4     12-04    121<L>  |  86<L>  |  44<H>  ----------------------------<  123<H>  4.7   |  19  |  4.4<HH>    Ca    6.7<L>      04 Dec 2018 06:23  Mg     1.7     12-03    TPro  6.8  /  Alb  1.5<L>  /  TBili  5.5<H>  /  DBili  3.6<H>  /  AST  290<H>  /  ALT  74<H>  /  AlkPhos  96  12-04    LIVER FUNCTIONS - ( 04 Dec 2018 06:23 )  Alb: 1.5 g/dL / Pro: 6.8 g/dL / ALK PHOS: 96 U/L / ALT: 74 U/L / AST: 290 U/L / GGT: x           PT/INR - ( 04 Dec 2018 06:23 )   PT: 31.90 sec;   INR: 2.89 ratio         PTT - ( 04 Dec 2018 06:23 )  PTT:56.6 sec    Culture - Body Fluid with Gram Stain (12.03.18 @ 14:19)    Gram Stain:   polymorphonuclear leukocytes seen per low power field  No organisms seen per oil power field  by cytocentrifuge    Specimen Source: .Body Fluid Peritoneal Fluid        Gram Stain:   Growth in anaerobic bottle: Gram Positive Cocci in Pairs and Chains (12.02.18 @ 12:24)          IMAGING: 58yMale  Being followed for 58y yo Male with ascites history of hepatitis C unsure if treated, ETOH abuse, Liver Cirrhosis   Interval history: As per nursing team patient has not had a bowel movement last night. Patient is bacteremic diagnosed with SBP. Patient is currently on Vancomycin and Ceftriaxone. Patient is more alert today than yesterday but very confused. Patient knows he is in the hospital, knows his name but does not answer any other questions appropriately       PMHX/PSHX: PAST MEDICAL & SURGICAL HISTORY:  Heroin abuse  Hepatitis C virus infection without hepatic coma, unspecified chronicity  ETOH abuse  Alcoholic cirrhosis of liver with ascites  No significant past surgical history        Social History: Unknown smoking Unknown illegal substance abuse. +ETOH abuse    MEDICATIONS:  MEDICATIONS  (STANDING):  ALBUTerol/ipratropium for Nebulization 3 milliLiter(s) Nebulizer every 6 hours  cefTRIAXone   IVPB      cefTRIAXone   IVPB 2 Gram(s) IV Intermittent every 24 hours  folic acid 1 milliGRAM(s) Oral daily  hydrocortisone sodium succinate Injectable 100 milliGRAM(s) IV Push every 6 hours  norepinephrine Infusion 0.05 MICROgram(s)/kG/Min (6.577 mL/Hr) IV Continuous <Continuous>  pantoprazole    Tablet 40 milliGRAM(s) Oral before breakfast  thiamine 100 milliGRAM(s) Oral daily    MEDICATIONS  (PRN):  dextrose 50% Injectable 50 milliLiter(s) IV Push every 2 hours PRN if fingerstick <70  lactulose Syrup 20 Gram(s) Oral three times a day PRN Constipation, titrate to 1-2 BM's daily      Allergies:  Allergies    No Known Allergies    Intolerances          REVIEW OF SYSTEMS:  Unobtainable       VITAL SIGNS:   T(F): 96.3 (12-04-18 @ 03:07), Max: 96.7 (12-03-18 @ 15:05)  HR: 94 (12-04-18 @ 08:47) (83 - 100)  BP: 107/59 (12-04-18 @ 08:47) (99/54 - 136/59)  RR: 17 (12-04-18 @ 08:47) (12 - 20)  SpO2: 97% (12-04-18 @ 08:47) (96% - 100%)      PHYSICAL EXAM  General: Morbidly Obese  HEENT: MMM, Normocephalic atraumatic, Icteric Sclera   Neck-No thyromegaly   Lungs: Clear, no Rhonchi  Heart-Normal s1/s2  Gastrointestinal: Largely Distended firm, tense Abdomen. +Ascites +Bowel Sounds +umbilical hernia   Skin: Warm and dry.   LE- +anasarca +3 pitting edema  Neuro-+ asterixis. not oriented to time, place or person.      LABS:                        8.1    10.90 )-----------( 80       ( 04 Dec 2018 06:23 )             24.4     12-04    121<L>  |  86<L>  |  44<H>  ----------------------------<  123<H>  4.7   |  19  |  4.4<HH>    Ca    6.7<L>      04 Dec 2018 06:23  Mg     1.7     12-03    TPro  6.8  /  Alb  1.5<L>  /  TBili  5.5<H>  /  DBili  3.6<H>  /  AST  290<H>  /  ALT  74<H>  /  AlkPhos  96  12-04    LIVER FUNCTIONS - ( 04 Dec 2018 06:23 )  Alb: 1.5 g/dL / Pro: 6.8 g/dL / ALK PHOS: 96 U/L / ALT: 74 U/L / AST: 290 U/L / GGT: x           PT/INR - ( 04 Dec 2018 06:23 )   PT: 31.90 sec;   INR: 2.89 ratio         PTT - ( 04 Dec 2018 06:23 )  PTT:56.6 sec    Culture - Body Fluid with Gram Stain (12.03.18 @ 14:19)    Gram Stain:   polymorphonuclear leukocytes seen per low power field  No organisms seen per oil power field  by cytocentrifuge    Specimen Source: .Body Fluid Peritoneal Fluid        Gram Stain:   Growth in anaerobic bottle: Gram Positive Cocci in Pairs and Chains (12.02.18 @ 12:24)          IMAGING:

## 2018-12-04 NOTE — PROCEDURE NOTE - NSPOSTCAREGUIDE_GEN_A_CORE
Verbal/written post procedure instructions were given to patient/caregiver
Keep the cast/splint/dressing clean and dry

## 2018-12-04 NOTE — CONSULT NOTE ADULT - CONSULT REQUESTED DATE/TIME
02-Dec-2018 16:28
03-Dec-2018 09:55
03-Dec-2018 10:07
03-Dec-2018 11:59
04-Dec-2018 08:50
04-Dec-2018 12:45
04-Dec-2018 18:40
04-Dec-2018 17:29

## 2018-12-04 NOTE — CONSULT NOTE ADULT - SUBJECTIVE AND OBJECTIVE BOX
BREANNA ENRIQUE 58yMalePatient is a 58y old  Male who presents with a chief complaint of Altered Mental Status (04 Dec 2018 08:33)      Patient has history of:  No Known Allergies    ALTERED MENTAL STATUS ANASARCA LIVER FAILURE ASCITES CIRRHOSIS  ^AMS  No pertinent family history in first degree relatives  Handoff  MEWS Score  Heroin abuse  Hepatitis C virus infection without hepatic coma, unspecified chronicity  ETOH abuse  Alcoholic cirrhosis of liver with ascites  No pertinent past medical history  Altered mental status  Paracentesis at bedside  No significant past surgical history  AMS  90+  Hypoglycemia  Cirrhosis  Ascites  Anemia  Liver failure  Anasarca        Patient treated with:  cefTRIAXone   IVPB      cefTRIAXone   IVPB 2 Gram(s) IV Intermittent every 24 hours        PHYSICAL EXAM  T(F): 96.3 (12-04-18 @ 03:07), Max: 96.7 (12-03-18 @ 15:05)  HR: 94 (12-04-18 @ 08:47) (83 - 100)  BP: 107/59 (12-04-18 @ 08:47) (99/54 - 142/68)  RR: 17 (12-04-18 @ 08:47) (12 - 20)  SpO2: 97% (12-04-18 @ 08:47) (96% - 100%)  Daily     Daily   HEENT: normal, no nuchal rigidity  Cor: RSR Nl S1 S2  Lungs: clear  Decreased breath sounds at bases    Abdomen: Nontender, Nl BS,     Ext: anasarca    LAB & RADIOLOGIC RESULTS:                        8.1    10.90 )-----------( 80       ( 04 Dec 2018 06:23 )             24.4         12-04    121<L>  |  86<L>  |  44<H>  ----------------------------<  123<H>  4.7   |  19  |  4.4<HH>    Mg     1.7     12-03    TPro  6.8  /  Alb  1.5<L>  /  TBili  5.5<H>  /  DBili  3.6<H>  /  AST  290<H>  /  ALT  74<H>  /  AlkPhos  96  12-04    <--<9>>5.4    Sodium, Serum: 121 mmol/L (12-04-18 @ 06:23)    Hyponatremia         Hypernatremia      Lactic Acidosis   Blood Gas Venous - Lactate: 5.4 mmoL/L (12-02-18 @ 12:50)      Acidosis         Creatinine, Serum: 4.4 mg/dL (12-04-18 @ 06:23)  eGFR if Non African American: 14 mL/min/1.73M2 (12-04-18 @ 06:23)  eGFR if African American: 16 mL/min/1.73M2 (12-04-18 @ 06:23)          PT/INR - ( 03 Dec 2018 06:35 )   PT: 35.40 sec;   INR: 3.20 ratio         PTT - ( 03 Dec 2018 06:35 )  PTT:54.2 sec    Culture - Body Fluid with Gram Stain (collected 03 Dec 2018 14:19)  Source: .Body Fluid Peritoneal Fluid  Gram Stain (04 Dec 2018 02:26):    polymorphonuclear leukocytes seen per low power field    No organisms seen per oil power field    by cytocentrifuge    Culture - Blood (collected 02 Dec 2018 12:24)  Source: .Blood Blood  Gram Stain (03 Dec 2018 19:18):    Growth in anaerobic bottle: Gram Positive Cocci in Pairs and Chains  Preliminary Report (03 Dec 2018 19:18):    Growth in anaerobic bottle: Gram Positive Cocci in Pairs and Chains    "Due to technical problems, Proteus sp. will Not be reported as part of    the BCID panel until further notice"    ***Blood Panel PCR results on this specimen are available    approximately 3 hours after the Gram stain result.***    Gram stain, PCR, and/or culture results may not always    correspond due to difference in methodologies.    ************************************************************    This PCR assay was performed using [employer].    The following targets are tested for: Enterococcus,    vancomycin resistant enterococci, Listeria monocytogenes,    coagulase negative staphylococci, S. aureus,    methicillin resistant S. aureus, Streptococcus agalactiae    (Group B), S. pneumoniae, S. pyogenes (Group A),    Acinetobacter baumannii, Enterobacter cloacae, E. coli,    Klebsiella oxytoca, K. pneumoniae, Proteus sp.,    Serratia marcescens, Haemophilus influenzae,    Neisseria meningitidis, Pseudomonas aeruginosa, Candida    albicans, C. glabrata, C krusei, C parapsilosis,    C. tropicalis and the KPC resistance gene.  Organism: Blood Culture PCR (03 Dec 2018 20:55)  Organism: Blood Culture PCR (03 Dec 2018 20:55)      -  Streptococcus sp. (Not Grp A, B or S pneumoniae): Detec      Method Type: PCR    Culture - Blood (collected 02 Dec 2018 12:24)  Source: .Blood Blood  Gram Stain (04 Dec 2018 01:58):    Growth in anaerobic bottle: Gram Positive Cocci in Pairs and Chains  Preliminary Report (04 Dec 2018 01:59):    Growth in anaerobic bottle: Gram Positive Cocci in Pairs and Chains

## 2018-12-05 LAB
-  CEFTRIAXONE: SIGNIFICANT CHANGE UP
-  PENICILLIN: SIGNIFICANT CHANGE UP
-  VANCOMYCIN: SIGNIFICANT CHANGE UP
ALBUMIN SERPL ELPH-MCNC: 2.8 G/DL — LOW (ref 3.5–5.2)
ALP SERPL-CCNC: 73 U/L — SIGNIFICANT CHANGE UP (ref 30–115)
ALT FLD-CCNC: 63 U/L — HIGH (ref 0–41)
ANION GAP SERPL CALC-SCNC: 17 MMOL/L — HIGH (ref 7–14)
ANION GAP SERPL CALC-SCNC: 20 MMOL/L — HIGH (ref 7–14)
APTT BLD: 44.9 SEC — HIGH (ref 27–39.2)
APTT BLD: 45.7 SEC — HIGH (ref 27–39.2)
APTT BLD: 46.9 SEC — HIGH (ref 27–39.2)
AST SERPL-CCNC: 198 U/L — HIGH (ref 0–41)
BASOPHILS # BLD AUTO: 0.01 K/UL — SIGNIFICANT CHANGE UP (ref 0–0.2)
BASOPHILS NFR BLD AUTO: 0.1 % — SIGNIFICANT CHANGE UP (ref 0–1)
BILIRUB DIRECT SERPL-MCNC: 3.1 MG/DL — HIGH (ref 0–0.2)
BILIRUB INDIRECT FLD-MCNC: 1.8 MG/DL — HIGH (ref 0.2–1.2)
BILIRUB SERPL-MCNC: 4.9 MG/DL — HIGH (ref 0.2–1.2)
BUN SERPL-MCNC: 51 MG/DL — HIGH (ref 10–20)
BUN SERPL-MCNC: 52 MG/DL — HIGH (ref 10–20)
CALCIUM SERPL-MCNC: 7.3 MG/DL — LOW (ref 8.5–10.1)
CALCIUM SERPL-MCNC: 7.8 MG/DL — LOW (ref 8.5–10.1)
CHLORIDE SERPL-SCNC: 88 MMOL/L — LOW (ref 98–110)
CHLORIDE SERPL-SCNC: 89 MMOL/L — LOW (ref 98–110)
CO2 SERPL-SCNC: 19 MMOL/L — SIGNIFICANT CHANGE UP (ref 17–32)
CO2 SERPL-SCNC: 20 MMOL/L — SIGNIFICANT CHANGE UP (ref 17–32)
CREAT SERPL-MCNC: 4.3 MG/DL — CRITICAL HIGH (ref 0.7–1.5)
CREAT SERPL-MCNC: 4.3 MG/DL — CRITICAL HIGH (ref 0.7–1.5)
CULTURE RESULTS: NO GROWTH — SIGNIFICANT CHANGE UP
CULTURE RESULTS: SIGNIFICANT CHANGE UP
D DIMER BLD IA.RAPID-MCNC: 7849 NG/ML DDU — HIGH (ref 0–230)
EOSINOPHIL # BLD AUTO: 0 K/UL — SIGNIFICANT CHANGE UP (ref 0–0.7)
EOSINOPHIL NFR BLD AUTO: 0 % — SIGNIFICANT CHANGE UP (ref 0–8)
FIBRINOGEN PPP-MCNC: 127.9 MG/DL — LOW (ref 204.4–570.6)
GLUCOSE BLDC GLUCOMTR-MCNC: 152 MG/DL — HIGH (ref 70–99)
GLUCOSE SERPL-MCNC: 142 MG/DL — HIGH (ref 70–99)
GLUCOSE SERPL-MCNC: 152 MG/DL — HIGH (ref 70–99)
GRAM STN FLD: SIGNIFICANT CHANGE UP
HBV CORE AB SER-ACNC: REACTIVE
HBV SURFACE AB SER-ACNC: SIGNIFICANT CHANGE UP
HBV SURFACE AG SER-ACNC: SIGNIFICANT CHANGE UP
HCT VFR BLD CALC: 21.1 % — LOW (ref 42–52)
HCT VFR BLD CALC: 24.1 % — LOW (ref 42–52)
HCT VFR BLD CALC: 24.7 % — LOW (ref 42–52)
HCV AB S/CO SERPL IA: 12.91 S/CO — SIGNIFICANT CHANGE UP
HCV AB SERPL-IMP: REACTIVE
HGB BLD-MCNC: 6.9 G/DL — CRITICAL LOW (ref 14–18)
HGB BLD-MCNC: 7.9 G/DL — LOW (ref 14–18)
HGB BLD-MCNC: 8.1 G/DL — LOW (ref 14–18)
IMM GRANULOCYTES NFR BLD AUTO: 1.1 % — HIGH (ref 0.1–0.3)
INR BLD: 2.44 RATIO — HIGH (ref 0.65–1.3)
INR BLD: 2.53 RATIO — HIGH (ref 0.65–1.3)
INR BLD: 2.62 RATIO — HIGH (ref 0.65–1.3)
LYMPHOCYTES # BLD AUTO: 0.39 K/UL — LOW (ref 1.2–3.4)
LYMPHOCYTES # BLD AUTO: 2.8 % — LOW (ref 20.5–51.1)
MAGNESIUM SERPL-MCNC: 1.9 MG/DL — SIGNIFICANT CHANGE UP (ref 1.8–2.4)
MCHC RBC-ENTMCNC: 31.2 PG — HIGH (ref 27–31)
MCHC RBC-ENTMCNC: 31.3 PG — HIGH (ref 27–31)
MCHC RBC-ENTMCNC: 31.8 PG — HIGH (ref 27–31)
MCHC RBC-ENTMCNC: 32.7 G/DL — SIGNIFICANT CHANGE UP (ref 32–37)
MCHC RBC-ENTMCNC: 32.8 G/DL — SIGNIFICANT CHANGE UP (ref 32–37)
MCHC RBC-ENTMCNC: 32.8 G/DL — SIGNIFICANT CHANGE UP (ref 32–37)
MCV RBC AUTO: 95 FL — HIGH (ref 80–94)
MCV RBC AUTO: 95.6 FL — HIGH (ref 80–94)
MCV RBC AUTO: 97.2 FL — HIGH (ref 80–94)
METHOD TYPE: SIGNIFICANT CHANGE UP
METHOD TYPE: SIGNIFICANT CHANGE UP
MONOCYTES # BLD AUTO: 0.72 K/UL — HIGH (ref 0.1–0.6)
MONOCYTES NFR BLD AUTO: 5.1 % — SIGNIFICANT CHANGE UP (ref 1.7–9.3)
NEUTROPHILS # BLD AUTO: 12.74 K/UL — HIGH (ref 1.4–6.5)
NEUTROPHILS NFR BLD AUTO: 90.9 % — HIGH (ref 42.2–75.2)
NRBC # BLD: 0 /100 WBCS — SIGNIFICANT CHANGE UP (ref 0–0)
NRBC # BLD: 0 /100 WBCS — SIGNIFICANT CHANGE UP (ref 0–0)
ORGANISM # SPEC MICROSCOPIC CNT: SIGNIFICANT CHANGE UP
ORGANISM # SPEC MICROSCOPIC CNT: SIGNIFICANT CHANGE UP
PHOSPHATE SERPL-MCNC: 6.9 MG/DL — HIGH (ref 2.1–4.9)
PLATELET # BLD AUTO: 52 K/UL — LOW (ref 130–400)
PLATELET # BLD AUTO: 57 K/UL — LOW (ref 130–400)
PLATELET # BLD AUTO: 78 K/UL — LOW (ref 130–400)
POTASSIUM SERPL-MCNC: 4.4 MMOL/L — SIGNIFICANT CHANGE UP (ref 3.5–5)
POTASSIUM SERPL-MCNC: 4.4 MMOL/L — SIGNIFICANT CHANGE UP (ref 3.5–5)
POTASSIUM SERPL-SCNC: 4.4 MMOL/L — SIGNIFICANT CHANGE UP (ref 3.5–5)
POTASSIUM SERPL-SCNC: 4.4 MMOL/L — SIGNIFICANT CHANGE UP (ref 3.5–5)
PROT SERPL-MCNC: 7.1 G/DL — SIGNIFICANT CHANGE UP (ref 6–8)
PROTHROM AB SERPL-ACNC: 26.8 SEC — HIGH (ref 9.95–12.87)
PROTHROM AB SERPL-ACNC: 27.8 SEC — HIGH (ref 9.95–12.87)
PROTHROM AB SERPL-ACNC: 28.9 SEC — HIGH (ref 9.95–12.87)
RBC # BLD: 2.17 M/UL — LOW (ref 4.7–6.1)
RBC # BLD: 2.52 M/UL — LOW (ref 4.7–6.1)
RBC # BLD: 2.6 M/UL — LOW (ref 4.7–6.1)
RBC # FLD: 17.7 % — HIGH (ref 11.5–14.5)
RBC # FLD: 18.8 % — HIGH (ref 11.5–14.5)
RBC # FLD: 19.1 % — HIGH (ref 11.5–14.5)
SODIUM SERPL-SCNC: 125 MMOL/L — LOW (ref 135–146)
SODIUM SERPL-SCNC: 128 MMOL/L — LOW (ref 135–146)
SPECIMEN SOURCE: SIGNIFICANT CHANGE UP
SPECIMEN SOURCE: SIGNIFICANT CHANGE UP
TSH SERPL-MCNC: 5.14 UIU/ML — HIGH (ref 0.27–4.2)
WBC # BLD: 10.67 K/UL — SIGNIFICANT CHANGE UP (ref 4.8–10.8)
WBC # BLD: 11.42 K/UL — HIGH (ref 4.8–10.8)
WBC # BLD: 14.01 K/UL — HIGH (ref 4.8–10.8)
WBC # FLD AUTO: 10.67 K/UL — SIGNIFICANT CHANGE UP (ref 4.8–10.8)
WBC # FLD AUTO: 11.42 K/UL — HIGH (ref 4.8–10.8)
WBC # FLD AUTO: 14.01 K/UL — HIGH (ref 4.8–10.8)

## 2018-12-05 RX ORDER — FUROSEMIDE 40 MG
0 TABLET ORAL
Qty: 30 | Refills: 0 | COMMUNITY

## 2018-12-05 RX ORDER — IPRATROPIUM/ALBUTEROL SULFATE 18-103MCG
3 AEROSOL WITH ADAPTER (GRAM) INHALATION
Qty: 0 | Refills: 0 | COMMUNITY
Start: 2018-12-05

## 2018-12-05 RX ORDER — FUROSEMIDE 40 MG
80 TABLET ORAL
Qty: 0 | Refills: 0 | COMMUNITY
Start: 2018-12-05

## 2018-12-05 RX ORDER — FUROSEMIDE 40 MG
40 TABLET ORAL ONCE
Qty: 0 | Refills: 0 | Status: COMPLETED | OUTPATIENT
Start: 2018-12-05 | End: 2018-12-05

## 2018-12-05 RX ORDER — CEFTRIAXONE 500 MG/1
1 INJECTION, POWDER, FOR SOLUTION INTRAMUSCULAR; INTRAVENOUS
Qty: 0 | Refills: 0 | COMMUNITY
Start: 2018-12-05

## 2018-12-05 RX ORDER — LACTULOSE 10 G/15ML
30 SOLUTION ORAL
Qty: 0 | Refills: 0 | COMMUNITY
Start: 2018-12-05

## 2018-12-05 RX ORDER — FUROSEMIDE 40 MG
80 TABLET ORAL EVERY 8 HOURS
Qty: 0 | Refills: 0 | Status: DISCONTINUED | OUTPATIENT
Start: 2018-12-05 | End: 2018-12-06

## 2018-12-05 RX ORDER — THIAMINE MONONITRATE (VIT B1) 100 MG
1 TABLET ORAL
Qty: 0 | Refills: 0 | COMMUNITY
Start: 2018-12-05

## 2018-12-05 RX ORDER — CEFTRIAXONE 500 MG/1
1 INJECTION, POWDER, FOR SOLUTION INTRAMUSCULAR; INTRAVENOUS EVERY 12 HOURS
Qty: 0 | Refills: 0 | Status: DISCONTINUED | OUTPATIENT
Start: 2018-12-05 | End: 2018-12-06

## 2018-12-05 RX ORDER — OXYCODONE HYDROCHLORIDE 5 MG/1
0 TABLET ORAL
Qty: 100 | Refills: 0 | COMMUNITY

## 2018-12-05 RX ORDER — VANCOMYCIN HCL 1 G
1 VIAL (EA) INTRAVENOUS
Qty: 0 | Refills: 0 | COMMUNITY
Start: 2018-12-05

## 2018-12-05 RX ORDER — FOLIC ACID 0.8 MG
1 TABLET ORAL
Qty: 0 | Refills: 0 | COMMUNITY
Start: 2018-12-05

## 2018-12-05 RX ORDER — SPIRONOLACTONE 25 MG/1
0 TABLET, FILM COATED ORAL
Qty: 30 | Refills: 0 | COMMUNITY

## 2018-12-05 RX ADMIN — Medication 40 MILLIGRAM(S): at 06:10

## 2018-12-05 RX ADMIN — PANTOPRAZOLE SODIUM 40 MILLIGRAM(S): 20 TABLET, DELAYED RELEASE ORAL at 09:58

## 2018-12-05 RX ADMIN — CEFTRIAXONE 100 GRAM(S): 500 INJECTION, POWDER, FOR SOLUTION INTRAMUSCULAR; INTRAVENOUS at 17:32

## 2018-12-05 RX ADMIN — Medication 100 MILLIGRAM(S): at 05:10

## 2018-12-05 RX ADMIN — Medication 100 MILLIGRAM(S): at 13:09

## 2018-12-05 RX ADMIN — Medication 3 MILLILITER(S): at 08:19

## 2018-12-05 RX ADMIN — Medication 80 MILLIGRAM(S): at 23:44

## 2018-12-05 RX ADMIN — Medication 3 MILLILITER(S): at 13:53

## 2018-12-05 RX ADMIN — Medication 3 MILLILITER(S): at 01:50

## 2018-12-05 RX ADMIN — Medication 80 MILLIGRAM(S): at 13:09

## 2018-12-05 RX ADMIN — CEFTRIAXONE 100 GRAM(S): 500 INJECTION, POWDER, FOR SOLUTION INTRAMUSCULAR; INTRAVENOUS at 05:10

## 2018-12-05 RX ADMIN — Medication 1 MILLIGRAM(S): at 11:34

## 2018-12-05 RX ADMIN — Medication 100 MILLIGRAM(S): at 23:44

## 2018-12-05 RX ADMIN — LACTULOSE 20 GRAM(S): 10 SOLUTION ORAL at 09:19

## 2018-12-05 NOTE — PROGRESS NOTE ADULT - SUBJECTIVE AND OBJECTIVE BOX
Preceding events noted  IV access issues last night  Pt unable to be dialysed yesterday  is awake responses slow.  Anasarca  septic  Hgb decreased 6.9   Pancytopenic  spoke with house staff and brother        SOCIAL HISTORY: etoh    MEDICATIONS  (STANDING):  ALBUTerol/ipratropium for Nebulization 3 milliLiter(s) Nebulizer every 6 hours  cefTRIAXone   IVPB 2 Gram(s) IV Intermittent every 12 hours  folic acid 1 milliGRAM(s) Oral daily  furosemide   Injectable 80 milliGRAM(s) IV Push every 8 hours  hydrocortisone sodium succinate Injectable 100 milliGRAM(s) IV Push every 8 hours  lactulose Syrup 20 Gram(s) Oral every 2 hours  pantoprazole    Tablet 40 milliGRAM(s) Oral before breakfast  rifaximin 550 milliGRAM(s) Oral two times a day  thiamine 100 milliGRAM(s) Oral daily    MEDICATIONS  (PRN):  dextrose 50% Injectable 50 milliLiter(s) IV Push every 2 hours PRN if fingerstick <70      Vital Signs Last 24 Hrs  T(C): 35.6 (05 Dec 2018 03:04), Max: 35.8 (04 Dec 2018 21:00)  T(F): 96 (05 Dec 2018 03:04), Max: 96.5 (04 Dec 2018 21:00)  HR: 101 (05 Dec 2018 08:07) (89 - 103)  BP: 128/73 (05 Dec 2018 08:07) (107/54 - 139/78)  BP(mean): 93 (05 Dec 2018 08:07) (74 - 98)  RR: 19 (05 Dec 2018 08:07) (12 - 118)  SpO2: 98% (05 Dec 2018 08:07) (96% - 100%)    PHYSICAL EXAM:        Neck: No LAD, No JVD  Back: Normal spine flexure, No CVA tenderness  Respiratory: CTAB/L  Cardiovascular: S1 and S2, RRR, no M/G/R  Gastrointestinal: BS+, Ascitis (+++)  Extremities: (+++) peripheral edema  Vascular: (-) peripheral pulses  Neurological: no focal deficits    LABS:                        6.9    10.67 )-----------( 52       ( 05 Dec 2018 03:30 )             21.1     12-05    125<L>  |  88<L>  |  51<H>  ----------------------------<  152<H>  4.4   |  20  |  4.3<HH>    Ca    7.3<L>      05 Dec 2018 03:30    TPro  7.1  /  Alb  2.8<L>  /  TBili  4.9<H>  /  DBili  3.1<H>  /  AST  198<H>  /  ALT  63<H>  /  AlkPhos  73  12-05    PT/INR - ( 05 Dec 2018 03:30 )   PT: 27.80 sec;   INR: 2.53 ratio         PTT - ( 05 Dec 2018 03:30 )  PTT:45.7 sec    Drug Screen Urine:  Alcohol Level  Alcohol, Blood: <10 mg/dL (12-02-18 @ 12:24)        RADIOLOGY & ADDITIONAL STUDIES:  reviewed in PACS

## 2018-12-05 NOTE — PROGRESS NOTE ADULT - ASSESSMENT
Sepsis  SBP?  Hepatic encephalopathy  Hepato renal syndrome?  Renal failure        IV Abx  IV access  Possible IR intervention for access  HD  continue lactulose, rifaximin, lasix  prognosis guarded  explained to brother  Other management per ICU team

## 2018-12-05 NOTE — DISCHARGE NOTE ADULT - CARE PLAN
Goal:	Hepatic encephalopathy  Assessment and plan of treatment:	Transferring to [facility] for further care.

## 2018-12-05 NOTE — PROGRESS NOTE ADULT - ASSESSMENT
IMPRESSION:  AMS likely metabolic septic shock   Gram positive bacteremia strep anginosus    SBP  LORENZO   Mild rhabdomyolysis- improved   Liver cirrhosis with failure    PLAN:    CNS: No sedatives; FU EEG; Thiamine folate; Utox     HEENT: Oral care;     PULMONARY: HOB at 45 degrees; Aspiration precautions     CARDIOVASCULAR: Keep I=<O; TTE    GI: GI prophylaxis. GI FU for possible transplant center referral; FU LFTs; Lactulose with target 1-2 BM /day;    RENAL: FU lytes case discussed with renal will hold on HD will start lasix     INFECTIOUS DISEASE: Panculture; ID consult continue ABX , vacno as per Kidney function   decadron , will try to attempt LP will reveres INR   HEMATOLOGICAL:  DVT prophylaxis--> SCD;  transfuse 1 unit of PRBC follow H/H   follow INR s/p FFP     ENDOCRINE:  Follow up FS.  Insulin protocol if needed. Decrease HC to q8; FU TSH    MICU monitoring for now IMPRESSION:  AMS likely metabolic septic shock   Gram positive bacteremia strep anginosus    SBP  LORENZO   Mild rhabdomyolysis- improved   Liver cirrhosis with failure    PLAN:    CNS: No sedatives; FU EEG; Thiamine folate; Utox     HEENT: Oral care;     PULMONARY: HOB at 45 degrees; Aspiration precautions     CARDIOVASCULAR: Keep I=<O; TTE    GI: GI prophylaxis. GI FU for possible transplant center referral; FU LFTs; Lactulose with target 1-2 BM /day;    RENAL: FU lytes case discussed with renal will hold on HD will start lasix     INFECTIOUS DISEASE: Panculture; ID consult continue ABX , vacno as per Kidney function   follow INR rocephine Q 1 gram   HEMATOLOGICAL:  DVT prophylaxis--> SCD;  transfuse 1 unit of PRBC follow H/H   follow INR s/p FFP     ENDOCRINE:  Follow up FS.  Insulin protocol if needed. Decrease HC to q8; FU TSH    MICU monitoring for now

## 2018-12-05 NOTE — PROGRESS NOTE ADULT - ASSESSMENT
BREANNA ENRIQUE 58y Male  MRN#: [id number]   CODE STATUS: FULL CODE    SUBJECTIVE  58 year old male with known hx of Cirrhosis (due to Hep C and ETOH abuse), heroin abuse, chronic back pain, and morbid obesity presented to the ED for AMS after being found by his neighbors on the floor of his home covered in feces. Admitted for Sepsis secondary to spontaneous bacterial peritonitis. Hospital stay complicated by severe agitation, renal failure, and bacteremia. Today is hospital day 4d, and this morning he is agitated and RN reports overnight the patient was agitated and pulled out his right femoral triple lumen central line. Intensivist overnight was unable to secure other central access, so he exchanged the patient's left femoral HD catheter for a triple lumen central line. Patient is now restrained and on a 1to1 sit.    Present Today:           Fitzpatrick Catheter (x)No/ ()Yes? Indication:          Central Line ()No/ (x)Yes? Indication: critically ill          IV Fluids (x)No/ ()Yes? Type:  Rate:  Indication:    OBJECTIVE  PAST MEDICAL & SURGICAL HISTORY  Heroin abuse  Hepatitis C virus infection without hepatic coma, unspecified chronicity  ETOH abuse  Alcoholic cirrhosis of liver with ascites  No significant past surgical history    ALLERGIES:  No Known Allergies    MEDICATIONS:  STANDING MEDICATIONS  ALBUTerol/ipratropium for Nebulization 3 milliLiter(s) Nebulizer every 6 hours  cefTRIAXone   IVPB 1 Gram(s) IV Intermittent every 12 hours  folic acid 1 milliGRAM(s) Oral daily  furosemide   Injectable 80 milliGRAM(s) IV Push every 8 hours  hydrocortisone sodium succinate Injectable 100 milliGRAM(s) IV Push every 8 hours  lactulose Syrup 20 Gram(s) Oral every 2 hours  pantoprazole    Tablet 40 milliGRAM(s) Oral before breakfast  rifaximin 550 milliGRAM(s) Oral two times a day  thiamine 100 milliGRAM(s) Oral daily    PRN MEDICATIONS  dextrose 50% Injectable 50 milliLiter(s) IV Push every 2 hours PRN    VITAL SIGNS: Last 24 Hours  T(F): 97.8 (05 Dec 2018 11:07), Max: 97.9 (05 Dec 2018 09:04)  HR: 110 (05 Dec 2018 10:07) (89 - 110)  BP: 105/64 (05 Dec 2018 10:07) (100/62 - 139/78)  RR: 18 (05 Dec 2018 11:07) (12 - 118)  SpO2: 99% (05 Dec 2018 10:07) (96% - 100%)    LABS:             7.9    11.42 )-----------( 57       ( 05 Dec 2018 08:00 )             24.1     128<L>  |  89<L>  |  52<H>  ----------------------------<  142<H>  4.4   |  19  |  4.3<HH>    Ca    7.8<L>      05 Dec 2018 08:00  Phos  6.9     12-05  Mg     1.9     12-05    TPro  7.1  /  Alb  2.8<L>  /  TBili  4.9<H>  /  DBili  3.1<H>  /  AST  198<H>  /  ALT  63<H>  /  AlkPhos  73  12-05    PT/INR - ( 05 Dec 2018 08:00 )   PT: 26.80 sec;   INR: 2.44 ratio    PTT - ( 05 Dec 2018 08:00 )  PTT:44.9 sec    Culture - Body Fluid with Gram Stain (collected 03 Dec 2018 14:19)  Source: .Body Fluid Peritoneal Fluid  Gram Stain (04 Dec 2018 02:26):    polymorphonuclear leukocytes seen per low power field    No organisms seen per oil power field    by cytocentrifuge  Preliminary Report (04 Dec 2018 21:46):    No growth to date.    Culture - Blood (collected 02 Dec 2018 12:24)  Source: .Blood Blood  Gram Stain (03 Dec 2018 19:18):    Growth in anaerobic bottle: Gram Positive Cocci in Pairs and Chains  Preliminary Report (04 Dec 2018 19:33):    Growth in anaerobic bottle: Streptococcus anginosus    "Due to technical problems, Proteus sp. will Not be reported as part of    the BCID panel until further notice"    ***Blood Panel PCR results on this specimen are available    approximately 3 hours after the Gram stain result.***    Gram stain, PCR, and/or culture results may not always    correspond due to difference in methodologies.    ************************************************************    This PCR assay was performed using [employer].    The following targets are tested for: Enterococcus,    vancomycin resistant enterococci, Listeria monocytogenes,    coagulase negative staphylococci, S. aureus,    methicillin resistant S. aureus, Streptococcus agalactiae    (Group B), S. pneumoniae, S. pyogenes (Group A),    Acinetobacter baumannii, Enterobacter cloacae, E. coli,    Klebsiella oxytoca, K. pneumoniae, Proteus sp.,    Serratia marcescens, Haemophilus influenzae,    Neisseria meningitidis, Pseudomonas aeruginosa, Candida    albicans, C. glabrata, C krusei, C parapsilosis,    C. tropicalis and the KPC resistance gene.  Organism: Blood Culture PCR (03 Dec 2018 20:55)  Organism: Blood Culture PCR (03 Dec 2018 20:55)    Culture - Blood (collected 02 Dec 2018 12:24)  Source: .Blood Blood  Gram Stain (05 Dec 2018 02:58):    Growth in anaerobic bottle: Gram Positive Cocci in Pairs and Chains    Growth in aerobic bottle: Gram Positive Cocci in Pairs and Chains  Preliminary Report (05 Dec 2018 02:58):    Growth in anaerobic bottle: Streptococcus anginosus    Growth in aerobic bottle: Gram Positive Cocci in Pairs and Chains    CARDIAC MARKERS ( 04 Dec 2018 06:23 )  x     / x     / 3595 U/L / x     / x        RADIOLOGY:  Xray Chest 1 View- PORTABLE-Urgent (12.05.18 @ 01:42)  1. New right lung base opacity.  2. Stable interstitial opacities.  3. No pneumothorax.    US Urinary Bladder (12.04.18 @ 16:46): Examination is severely limited by portable technique and patient's body habitus. Urinary bladder is not definitely visualized.    PHYSICAL EXAM:  GENERAL: AAOx1-2, obese, poorly functional, right femoral TLC central line present, clean and dry  HEENT:  Atraumatic, Normocephalic. EOMI, PERRLA, scleral icterus  PULMONARY: Decreased air entry bilaterally  CARDIOVASCULAR: Regular rate and rhythm; No murmurs, rubs, or gallops  GASTROINTESTINAL: Distended and tense, with large umbilical hernia  MUSCULOSKELETAL:  Diffuse edema pitting throughout all extremities  NEUROLOGY: non-focal, positive for asterixis   : Retracted Penis, unable to assess or find urethral meatus    ADMISSION SUMMARY  58 year old male with known hx of Cirrhosis (due to Hep C and ETOH abuse), heroin abuse, chronic back pain, and morbid obesity presented to the ED for AMS after being found by his neighbors on the floor of his home covered in feces. Admitted for Sepsis secondary to spontaneous bacterial peritonitis. Hospital stay complicated by severe agitation, renal failure, and bacteremia.    ASSESSMENT & PLAN  1.  Altered Mental Status suspected due to sepsis secondary to spontaneous bacterial peritonitis complicated by bacteremia and Supra-therapeutic INR with known hx of ETOH and Heroin abuse, Hep C, and Cirrhosis  - Pulm, ID, Neuro, and GI following  - Diagnostic Paracentesis done with results positive for spontaneous bacterial peritonitis   - Continue Rocephin and Vancomycin   - Blood culture positive for Streptococcus anginosus  - Lactulose q2h to titrate to 2-3bm's daily   - Thiamine, folic acid  - Utox positive for opiates  - Follow up Echo to rule out endocarditis  - Continue hydrocortisone to q8h  - S/p Albumin treatment for SBP  - Received one unit PRBC overnight for anemia, with good response     2. Severe LORENZO, bordering on Renal Failure; consider possible hepatorenal syndrome  - Renal following  - SCr around 4, from baseline 1.5, stabilizing  - Keep I=O  - Urology unable to secure Fitzpatrick access  - Plan to diurese with Lasix 80mg q8h and hold off of on HD at this time  - Will re-evaluate to see if patient's output picks up    3. Hyponatremia - suspect hypervolemic (dilutional)  - Improving, slowly  - Continue to avoid IV fluids    4. Thiamine Deficiency, suspected - continue thiamine supplementation    5. DVT Prophylaxis - ICDs, hold pharmacologic prophylaxis due to coagulopathy     Present today:  ( ) Congestive Heart Failure, Yes? ( )Acute / ( )Acute on Chronic / ( )Chronic  :  ( )Systolic / ( )Diastolic               Plan:  ( ) Complicated Pneumonia, Type?  ( )Parapneumonic effusion / ( )Abscess / ( ) Multilobar / ( )Other               Plan:  ( ) Morbid Obesity, Yes? BMI:               Plan:  ( ) Functional Quadriplegia               Plan:  (x) Encephalopathy               Plan: suspect uremia v. sepsis as cause, start HD, continue antibiotics, and EEG to rule out seizures    (x) Discussion with patient and/or family regarding goals of care  (x) Discussed Case and Plan with Medical Attending    # Planned Disposition: Pending BREANNA ENRIQUE 58y Male  MRN#: [id number]   CODE STATUS: FULL CODE    SUBJECTIVE  58 year old male with known hx of Cirrhosis (due to Hep C and ETOH abuse), heroin abuse, chronic back pain, and morbid obesity presented to the ED for AMS after being found by his neighbors on the floor of his home covered in feces. Admitted for Sepsis secondary to spontaneous bacterial peritonitis. Hospital stay complicated by severe agitation, renal failure, and bacteremia. Today is hospital day 4d, and this morning he is agitated and RN reports overnight the patient was agitated and pulled out his right femoral triple lumen central line. Intensivist overnight was unable to secure other central access, so he exchanged the patient's left femoral HD catheter for a triple lumen central line. Patient is now restrained and on a 1to1 sit.    Present Today:           Fitzpatrick Catheter (x)No/ ()Yes? Indication:          Central Line ()No/ (x)Yes? Indication: critically ill          IV Fluids (x)No/ ()Yes? Type:  Rate:  Indication:    OBJECTIVE  PAST MEDICAL & SURGICAL HISTORY  Heroin abuse  Hepatitis C virus infection without hepatic coma, unspecified chronicity  ETOH abuse  Alcoholic cirrhosis of liver with ascites  No significant past surgical history    ALLERGIES:  No Known Allergies    MEDICATIONS:  STANDING MEDICATIONS  ALBUTerol/ipratropium for Nebulization 3 milliLiter(s) Nebulizer every 6 hours  cefTRIAXone   IVPB 1 Gram(s) IV Intermittent every 12 hours  folic acid 1 milliGRAM(s) Oral daily  furosemide   Injectable 80 milliGRAM(s) IV Push every 8 hours  hydrocortisone sodium succinate Injectable 100 milliGRAM(s) IV Push every 8 hours  lactulose Syrup 20 Gram(s) Oral every 2 hours  pantoprazole    Tablet 40 milliGRAM(s) Oral before breakfast  rifaximin 550 milliGRAM(s) Oral two times a day  thiamine 100 milliGRAM(s) Oral daily    PRN MEDICATIONS  dextrose 50% Injectable 50 milliLiter(s) IV Push every 2 hours PRN    VITAL SIGNS: Last 24 Hours  T(F): 97.8 (05 Dec 2018 11:07), Max: 97.9 (05 Dec 2018 09:04)  HR: 110 (05 Dec 2018 10:07) (89 - 110)  BP: 105/64 (05 Dec 2018 10:07) (100/62 - 139/78)  RR: 18 (05 Dec 2018 11:07) (12 - 118)  SpO2: 99% (05 Dec 2018 10:07) (96% - 100%)    LABS:             7.9    11.42 )-----------( 57       ( 05 Dec 2018 08:00 )             24.1     128<L>  |  89<L>  |  52<H>  ----------------------------<  142<H>  4.4   |  19  |  4.3<HH>    Ca    7.8<L>      05 Dec 2018 08:00  Phos  6.9     12-05  Mg     1.9     12-05    TPro  7.1  /  Alb  2.8<L>  /  TBili  4.9<H>  /  DBili  3.1<H>  /  AST  198<H>  /  ALT  63<H>  /  AlkPhos  73  12-05    PT/INR - ( 05 Dec 2018 08:00 )   PT: 26.80 sec;   INR: 2.44 ratio    PTT - ( 05 Dec 2018 08:00 )  PTT:44.9 sec    Culture - Body Fluid with Gram Stain (collected 03 Dec 2018 14:19)  Source: .Body Fluid Peritoneal Fluid  Gram Stain (04 Dec 2018 02:26):    polymorphonuclear leukocytes seen per low power field    No organisms seen per oil power field    by cytocentrifuge  Preliminary Report (04 Dec 2018 21:46):    No growth to date.    Culture - Blood (collected 02 Dec 2018 12:24)  Source: .Blood Blood  Gram Stain (03 Dec 2018 19:18):    Growth in anaerobic bottle: Gram Positive Cocci in Pairs and Chains  Preliminary Report (04 Dec 2018 19:33):    Growth in anaerobic bottle: Streptococcus anginosus    "Due to technical problems, Proteus sp. will Not be reported as part of    the BCID panel until further notice"    ***Blood Panel PCR results on this specimen are available    approximately 3 hours after the Gram stain result.***    Gram stain, PCR, and/or culture results may not always    correspond due to difference in methodologies.    ************************************************************    This PCR assay was performed using [employer].    The following targets are tested for: Enterococcus,    vancomycin resistant enterococci, Listeria monocytogenes,    coagulase negative staphylococci, S. aureus,    methicillin resistant S. aureus, Streptococcus agalactiae    (Group B), S. pneumoniae, S. pyogenes (Group A),    Acinetobacter baumannii, Enterobacter cloacae, E. coli,    Klebsiella oxytoca, K. pneumoniae, Proteus sp.,    Serratia marcescens, Haemophilus influenzae,    Neisseria meningitidis, Pseudomonas aeruginosa, Candida    albicans, C. glabrata, C krusei, C parapsilosis,    C. tropicalis and the KPC resistance gene.  Organism: Blood Culture PCR (03 Dec 2018 20:55)  Organism: Blood Culture PCR (03 Dec 2018 20:55)    Culture - Blood (collected 02 Dec 2018 12:24)  Source: .Blood Blood  Gram Stain (05 Dec 2018 02:58):    Growth in anaerobic bottle: Gram Positive Cocci in Pairs and Chains    Growth in aerobic bottle: Gram Positive Cocci in Pairs and Chains  Preliminary Report (05 Dec 2018 02:58):    Growth in anaerobic bottle: Streptococcus anginosus    Growth in aerobic bottle: Gram Positive Cocci in Pairs and Chains    CARDIAC MARKERS ( 04 Dec 2018 06:23 )  x     / x     / 3595 U/L / x     / x        RADIOLOGY:  Xray Chest 1 View- PORTABLE-Urgent (12.05.18 @ 01:42)  1. New right lung base opacity.  2. Stable interstitial opacities.  3. No pneumothorax.    US Urinary Bladder (12.04.18 @ 16:46): Examination is severely limited by portable technique and patient's body habitus. Urinary bladder is not definitely visualized.    PHYSICAL EXAM:  GENERAL: AAOx1-2, obese, poorly functional, right femoral TLC central line present, clean and dry  HEENT:  Atraumatic, Normocephalic. EOMI, PERRLA, scleral icterus  PULMONARY: Decreased air entry bilaterally  CARDIOVASCULAR: Regular rate and rhythm; No murmurs, rubs, or gallops  GASTROINTESTINAL: Distended and tense, with large umbilical hernia  MUSCULOSKELETAL:  Diffuse edema pitting throughout all extremities  NEUROLOGY: non-focal, positive for asterixis   : Retracted Penis, unable to assess or find urethral meatus    ADMISSION SUMMARY  58 year old male with known hx of Cirrhosis (due to Hep C and ETOH abuse), heroin abuse, chronic back pain, and morbid obesity presented to the ED for AMS after being found by his neighbors on the floor of his home covered in feces. Admitted for Sepsis secondary to spontaneous bacterial peritonitis. Hospital stay complicated by severe agitation, renal failure, and bacteremia.    ASSESSMENT & PLAN  1.  Altered Mental Status suspected due to sepsis secondary to spontaneous bacterial peritonitis complicated by bacteremia and Supra-therapeutic INR with known hx of ETOH and Heroin abuse, Hep C, and Cirrhosis  - Pulm, ID, Neuro, and GI following  - Diagnostic Paracentesis done with results positive for spontaneous bacterial peritonitis   - Continue Rocephin and Vancomycin   - Blood culture positive for Streptococcus anginosus  - Lactulose q2h to titrate to 2-3bm's daily   - Thiamine, folic acid  - Utox positive for opiates  - Follow up Echo to rule out endocarditis  - Continue hydrocortisone to q8h  - S/p Albumin treatment for SBP  - Received one unit PRBC overnight for anemia, with good response   - To contact [facility]ology (Dr. Bowers; [phone]) for Liver transplant evaluation    2. Severe LORENZO, bordering on Renal Failure; consider possible hepatorenal syndrome  - Renal following  - SCr around 4, from baseline 1.5, stabilizing  - Keep I=O  - Urology unable to secure Fitzpatrick access  - Plan to diurese with Lasix 80mg q8h and hold off of on HD at this time  - Will re-evaluate to see if patient's output picks up    3. Hyponatremia - suspect hypervolemic (dilutional)  - Improving, slowly  - Continue to avoid IV fluids    4. Thiamine Deficiency, suspected - continue thiamine supplementation    5. DVT Prophylaxis - ICDs, hold pharmacologic prophylaxis due to coagulopathy     Present today:  ( ) Congestive Heart Failure, Yes? ( )Acute / ( )Acute on Chronic / ( )Chronic  :  ( )Systolic / ( )Diastolic               Plan:  ( ) Complicated Pneumonia, Type?  ( )Parapneumonic effusion / ( )Abscess / ( ) Multilobar / ( )Other               Plan:  ( ) Morbid Obesity, Yes? BMI:               Plan:  ( ) Functional Quadriplegia               Plan:  (x) Encephalopathy               Plan: suspect uremia v. sepsis as cause, start HD, continue antibiotics, and EEG to rule out seizures    (x) Discussion with patient and/or family regarding goals of care  (x) Discussed Case and Plan with Medical Attending    # Planned Disposition: Pending BREANNA ENRIQUE 58y Male  MRN#: [id number]   CODE STATUS: FULL CODE    SUBJECTIVE  58 year old male with known hx of Cirrhosis (due to Hep C and ETOH abuse), heroin abuse, chronic back pain, and morbid obesity presented to the ED for AMS after being found by his neighbors on the floor of his home covered in feces. Admitted for Sepsis secondary to spontaneous bacterial peritonitis. Hospital stay complicated by severe agitation, renal failure, thrombocytopenia, and bacteremia. Today is hospital day 4d, and this morning he is agitated and RN reports overnight the patient was agitated and pulled out his right femoral triple lumen central line. Intensivist overnight was unable to secure other central access, so he exchanged the patient's left femoral HD catheter for a triple lumen central line. Patient is now restrained and on a 1to1 sit.    Present Today:           Fitzpatrick Catheter (x)No/ ()Yes? Indication:          Central Line ()No/ (x)Yes? Indication: critically ill          IV Fluids (x)No/ ()Yes? Type:  Rate:  Indication:    OBJECTIVE  PAST MEDICAL & SURGICAL HISTORY  Heroin abuse  Hepatitis C virus infection without hepatic coma, unspecified chronicity  ETOH abuse  Alcoholic cirrhosis of liver with ascites  No significant past surgical history    ALLERGIES:  No Known Allergies    MEDICATIONS:  STANDING MEDICATIONS  ALBUTerol/ipratropium for Nebulization 3 milliLiter(s) Nebulizer every 6 hours  cefTRIAXone   IVPB 1 Gram(s) IV Intermittent every 12 hours  folic acid 1 milliGRAM(s) Oral daily  furosemide   Injectable 80 milliGRAM(s) IV Push every 8 hours  hydrocortisone sodium succinate Injectable 100 milliGRAM(s) IV Push every 8 hours  lactulose Syrup 20 Gram(s) Oral every 2 hours  pantoprazole    Tablet 40 milliGRAM(s) Oral before breakfast  rifaximin 550 milliGRAM(s) Oral two times a day  thiamine 100 milliGRAM(s) Oral daily    PRN MEDICATIONS  dextrose 50% Injectable 50 milliLiter(s) IV Push every 2 hours PRN    VITAL SIGNS: Last 24 Hours  T(F): 97.8 (05 Dec 2018 11:07), Max: 97.9 (05 Dec 2018 09:04)  HR: 110 (05 Dec 2018 10:07) (89 - 110)  BP: 105/64 (05 Dec 2018 10:07) (100/62 - 139/78)  RR: 18 (05 Dec 2018 11:07) (12 - 118)  SpO2: 99% (05 Dec 2018 10:07) (96% - 100%)    LABS:             7.9    11.42 )-----------( 57       ( 05 Dec 2018 08:00 )             24.1     128<L>  |  89<L>  |  52<H>  ----------------------------<  142<H>  4.4   |  19  |  4.3<HH>    Ca    7.8<L>      05 Dec 2018 08:00  Phos  6.9     12-05  Mg     1.9     12-05    TPro  7.1  /  Alb  2.8<L>  /  TBili  4.9<H>  /  DBili  3.1<H>  /  AST  198<H>  /  ALT  63<H>  /  AlkPhos  73  12-05    PT/INR - ( 05 Dec 2018 08:00 )   PT: 26.80 sec;   INR: 2.44 ratio    PTT - ( 05 Dec 2018 08:00 )  PTT:44.9 sec    Culture - Body Fluid with Gram Stain (collected 03 Dec 2018 14:19)  Source: .Body Fluid Peritoneal Fluid  Gram Stain (04 Dec 2018 02:26):    polymorphonuclear leukocytes seen per low power field    No organisms seen per oil power field    by cytocentrifuge  Preliminary Report (04 Dec 2018 21:46):    No growth to date.    Culture - Blood (collected 02 Dec 2018 12:24)  Source: .Blood Blood  Gram Stain (03 Dec 2018 19:18):    Growth in anaerobic bottle: Gram Positive Cocci in Pairs and Chains  Preliminary Report (04 Dec 2018 19:33):    Growth in anaerobic bottle: Streptococcus anginosus    "Due to technical problems, Proteus sp. will Not be reported as part of    the BCID panel until further notice"    ***Blood Panel PCR results on this specimen are available    approximately 3 hours after the Gram stain result.***    Gram stain, PCR, and/or culture results may not always    correspond due to difference in methodologies.    ************************************************************    This PCR assay was performed using [employer].    The following targets are tested for: Enterococcus,    vancomycin resistant enterococci, Listeria monocytogenes,    coagulase negative staphylococci, S. aureus,    methicillin resistant S. aureus, Streptococcus agalactiae    (Group B), S. pneumoniae, S. pyogenes (Group A),    Acinetobacter baumannii, Enterobacter cloacae, E. coli,    Klebsiella oxytoca, K. pneumoniae, Proteus sp.,    Serratia marcescens, Haemophilus influenzae,    Neisseria meningitidis, Pseudomonas aeruginosa, Candida    albicans, C. glabrata, C krusei, C parapsilosis,    C. tropicalis and the KPC resistance gene.  Organism: Blood Culture PCR (03 Dec 2018 20:55)  Organism: Blood Culture PCR (03 Dec 2018 20:55)    Culture - Blood (collected 02 Dec 2018 12:24)  Source: .Blood Blood  Gram Stain (05 Dec 2018 02:58):    Growth in anaerobic bottle: Gram Positive Cocci in Pairs and Chains    Growth in aerobic bottle: Gram Positive Cocci in Pairs and Chains  Preliminary Report (05 Dec 2018 02:58):    Growth in anaerobic bottle: Streptococcus anginosus    Growth in aerobic bottle: Gram Positive Cocci in Pairs and Chains    CARDIAC MARKERS ( 04 Dec 2018 06:23 )  x     / x     / 3595 U/L / x     / x        RADIOLOGY:  Xray Chest 1 View- PORTABLE-Urgent (12.05.18 @ 01:42)  1. New right lung base opacity.  2. Stable interstitial opacities.  3. No pneumothorax.    US Urinary Bladder (12.04.18 @ 16:46): Examination is severely limited by portable technique and patient's body habitus. Urinary bladder is not definitely visualized.    PHYSICAL EXAM:  GENERAL: AAOx1-2, obese, poorly functional, right femoral TLC central line present, clean and dry  HEENT:  Atraumatic, Normocephalic. EOMI, PERRLA, scleral icterus  PULMONARY: Decreased air entry bilaterally  CARDIOVASCULAR: Regular rate and rhythm; No murmurs, rubs, or gallops  GASTROINTESTINAL: Distended and tense, with large umbilical hernia  MUSCULOSKELETAL:  Diffuse edema pitting throughout all extremities  NEUROLOGY: non-focal, positive for asterixis   : Retracted Penis, unable to assess or find urethral meatus    ADMISSION SUMMARY  58 year old male with known hx of Cirrhosis (due to Hep C and ETOH abuse), heroin abuse, chronic back pain, and morbid obesity presented to the ED for AMS after being found by his neighbors on the floor of his home covered in feces. Admitted for Sepsis secondary to spontaneous bacterial peritonitis. Hospital stay complicated by severe agitation, renal failure, thrombocytopenia, and bacteremia.    ASSESSMENT & PLAN  1.  Altered Mental Status suspected due to sepsis secondary to spontaneous bacterial peritonitis complicated by bacteremia, thrombocytopenia, and Supra-therapeutic INR with known hx of ETOH and Heroin abuse, Hep C, and Cirrhosis  - Pulm, ID, Neuro, and GI following  - Diagnostic Paracentesis done with results positive for spontaneous bacterial peritonitis   - Continue Rocephin and Vancomycin   - Blood culture positive for Streptococcus anginosus  - Lactulose q2h to titrate to 2-3bm's daily   - Thiamine, folic acid  - Thrombocytopenia, worsening possibly due to liver decompensation v. DIC (lower probability, need to rule out); check CBC, Fibrinogen, DDimer, Haptoglobin, PTT, PT, INR  - Follow up Echo to rule out endocarditis  - Continue hydrocortisone to q8h  - S/p Albumin treatment for SBP  - Received one unit PRBC overnight for anemia, with good response   - Contacted [facility] Liver Transplant [facility] for evaluation as requested by ICU and GI Attendings here  - Has been accepted by [facility] for transfer to their ICU for further evaluation by Hepatology and ICU teams, as well as possible liver transplant  - Currently awaiting an available bed at [facility] ICU (Transfer Center # [phone], Accepting Hepatology Physician: Dr. Romero)    2. Severe LORENZO, bordering on Renal Failure; consider possible hepatorenal syndrome  - Renal following  - SCr around 4, from baseline 1.5, stabilizing  - Keep I=O  - Urology unable to secure Fitzpatrick access  - Plan to diurese with Lasix 80mg q8h and hold off of on HD at this time  - Consider possible addition of Spironolactone as per GI physician, if patient remains overtly fluid overloaded  - Will re-evaluate to see if patient's output picks up    3. Hyponatremia - suspect hypervolemic (dilutional)  - Improving, slowly  - Continue to avoid IV fluids    4. Thiamine Deficiency, suspected - continue thiamine supplementation    5. DVT Prophylaxis - ICDs, hold pharmacologic prophylaxis due to coagulopathy         Present today:  ( ) Congestive Heart Failure, Yes? ( )Acute / ( )Acute on Chronic / ( )Chronic  :  ( )Systolic / ( )Diastolic               Plan:  ( ) Complicated Pneumonia, Type?  ( )Parapneumonic effusion / ( )Abscess / ( ) Multilobar / ( )Other               Plan:  ( ) Morbid Obesity, Yes? BMI:               Plan:  ( ) Functional Quadriplegia               Plan:  (x) Encephalopathy               Plan: suspect uremia v. sepsis as cause, start HD, continue antibiotics, and EEG to rule out seizures    (x) Discussion with patient and/or family regarding goals of care  (x) Discussed Case and Plan with Medical Attending    # Planned Disposition: Pending

## 2018-12-05 NOTE — PROGRESS NOTE ADULT - ASSESSMENT
Severe Sepsis (temp<96.8F, pulse>90 beats/min, wbc>12,  lactic acidosis, acute kidney injury, metabolic encephalopathy) due to Streptococcus sp bacteremia  R/O Spontaneous bacterial peritonitis in pt with cirrhosis (ETOH & hepC)    Pt with hyponatremia, thrombocytopenia    On cefTRIAXone   IVPB 2 Gram(s) IV Intermittent every 24 hours    CT with atelectasis, pleural effusions, ascites and cirrhosis    SUGGESTIONs  Await peritoneal fluid culture  Continue Rocephin # Severe Sepsis   # Metabolic encephalopathy)  # Spontaneous bacterial peritonitis  # Bacteremia- Streptococcus   # Liver cirrhosis (ETOH & hepC)    Would recommend:  1. NEED PARACENTESIS  2. Adjust Ceftriaxone doses to 2g daily  3. Keep extremities are elevated  4. Monitor WBC count    d/w ICU team       - Agree with  transferring to [facility] # Severe Sepsis   # Metabolic encephalopathy)  # Spontaneous bacterial peritonitis  # Bacteremia- Streptococcus   # Liver cirrhosis (ETOH & hepC)    Would recommend:  1. NEED PARACENTESIS  2. Adjust Ceftriaxone doses to 2g daily  3. Keep extremities are elevated  4. Monitor WBC count    d/w CCU team       - Agree with  transferring to [facility]

## 2018-12-05 NOTE — PROGRESS NOTE ADULT - ASSESSMENT
LORENZO    - cr slightly better and now non-oliguric   - hold off HSD since HD catheter out and renal function may be improving   -  unable to place zavala   hyponatremia   - better  anasarca / ascites  GPC bacteremia  cirrhosis  etoh abuse  hep c  heroin abuse      plan:    hold off HD for now  start lasix 80mg IVP Q8H  paracentesis today - aim for 4kg off with iv albumin  please send UA  monitor UOP and renal function  iv abx per ID  cont lactulose and rifaximin  spoke with team      critical care 35min

## 2018-12-05 NOTE — PROGRESS NOTE ADULT - ASSESSMENT
57 yo M with HCV cirrhosis admitted with change in mental status and massive ascites, SBP and everted umbilicus.  He has severe renal insufficiency and nephrology is following.  He denies abdominal pain, nausea or vomiting.  Pt has note had hematemesis, hematochezia or melena but by the nurse's/HO report he pulled out his femoral line and there was prolonged bleeding at its insertion site.    Ascites:  Nephrology has started lasix 80 mg po q 8 hrs for diuresis.  Since patient pulled out his femoral catheter, dialysis is on hold for now.  HO will call Nephrologist to approve the use of aldactone as well as lasix to prevent further accumulation of ascites.  HO will also address dialysis and ascites removal at that time since ascites is tense at this point and respiratory compromise.    Anemia:  Hb now 7.9 and there is NO GI bleeding but Nsg reports there was significant bleeding from catheter sites when patient removed them forcibly last night.  I would recommend giving a two unit transfusion to get Hb well above 8.    Encephalopathy:  Pt is no Lactulose and Rifaximin and his mental status is somnolent, and he is oriented only x 1.  I would aggressively try to correct his MS by giving lactulose q 2 hrs titrating to three soft BMs per day.    SBP:  Pt is currently on Ceftriaxone and vancomycin for SBP diagnosed on admission and B/C positive for GPC.

## 2018-12-05 NOTE — DISCHARGE NOTE ADULT - HOSPITAL COURSE
Altered Mental Status suspected due to sepsis secondary to spontaneous bacterial peritonitis complicated by bacteremia, thrombocytopenia, and Supra-therapeutic INR with known hx of ETOH and Heroin abuse, Hep C, and Cirrhosis. Continue Rocephin and Vancomycin   - Blood culture positive for Streptococcus anginosus.  Thiamine, folic acid supplementation  - Thrombocytopenia, worsening possibly due to liver decompensation v. DIC (lower probability, need to rule out);   Severe LORENZO, bordering on Renal Failure; consider possible hepatorenal syndrome  - Renal following

## 2018-12-05 NOTE — PROGRESS NOTE ADULT - ATTENDING COMMENTS
as above
Pt seen and examined with JO ANN Mckeon and his note above was modified by me.  Case discussed with ICU resident (Dr Mcclelland).

## 2018-12-05 NOTE — PROGRESS NOTE ADULT - SUBJECTIVE AND OBJECTIVE BOX
Patient is a 58y old  Male who presents with a chief complaint of Altered Mental Status (05 Dec 2018 11:38)      INTERVAL HPI/OVERNIGHT EVENTS:    MEDICATIONS  (STANDING):  ALBUTerol/ipratropium for Nebulization 3 milliLiter(s) Nebulizer every 6 hours  cefTRIAXone   IVPB 1 Gram(s) IV Intermittent every 12 hours  folic acid 1 milliGRAM(s) Oral daily  furosemide   Injectable 80 milliGRAM(s) IV Push every 8 hours  hydrocortisone sodium succinate Injectable 100 milliGRAM(s) IV Push every 8 hours  lactulose Syrup 20 Gram(s) Oral every 2 hours  pantoprazole    Tablet 40 milliGRAM(s) Oral before breakfast  rifaximin 550 milliGRAM(s) Oral two times a day  thiamine 100 milliGRAM(s) Oral daily    MEDICATIONS  (PRN):  dextrose 50% Injectable 50 milliLiter(s) IV Push every 2 hours PRN if fingerstick <70      Allergies    No Known Allergies    Intolerances        REVIEW OF SYSTEMS:  CONSTITUTIONAL: No fever, weight loss, or fatigue  EYES: No eye pain, visual disturbances, or discharge  ENMT:  No difficulty hearing, tinnitus, vertigo; No sinus or throat pain  NECK: No pain or stiffness  BREASTS: No pain, masses, or nipple discharge  RESPIRATORY: No cough, wheezing, chills or hemoptysis; No shortness of breath  CARDIOVASCULAR: No chest pain, palpitations, dizziness, or leg swelling  GASTROINTESTINAL: No abdominal or epigastric pain. No nausea, vomiting, or hematemesis; No diarrhea or constipation. No melena or hematochezia.  GENITOURINARY: No dysuria, frequency, hematuria, or incontinence  NEUROLOGICAL: No headaches, memory loss, loss of strength, numbness, or tremors  SKIN: No itching, burning, rashes, or lesions   LYMPH NODES: No enlarged glands  ENDOCRINE: No heat or cold intolerance; No hair loss  MUSCULOSKELETAL: No joint pain or swelling; No muscle, back, or extremity pain  PSYCHIATRIC: No depression, anxiety, mood swings, or difficulty sleeping  HEME/LYMPH: No easy bruising, or bleeding gums  ALLERGY AND IMMUNOLOGIC: No hives or eczema    Vital Signs Last 24 Hrs  T(C): 35.3 (05 Dec 2018 17:00), Max: 36.6 (05 Dec 2018 09:04)  T(F): 95.5 (05 Dec 2018 17:00), Max: 97.9 (05 Dec 2018 09:04)  HR: 100 (05 Dec 2018 19:00) (90 - 115)  BP: 133/64 (05 Dec 2018 19:00) (100/62 - 154/74)  BP(mean): 91 (05 Dec 2018 19:00) (74 - 106)  RR: 19 (05 Dec 2018 19:00) (16 - 118)  SpO2: 100% (05 Dec 2018 19:00) (94% - 100%)    PHYSICAL EXAM:  GENERAL: NAD, well-groomed, well-developed  HEAD:  Atraumatic, Normocephalic  EYES: EOMI, PERRLA, conjunctiva and sclera clear  ENMT: No tonsillar erythema, exudates, or enlargement; Moist mucous membranes, Good dentition, No lesions  NECK: Supple, No JVD, Normal thyroid  NERVOUS SYSTEM:  Alert & Oriented X3, Good concentration; Motor Strength 5/5 B/L upper and lower extremities; DTRs 2+ intact and symmetric  CHEST/LUNG: Clear to percussion bilaterally; No rales, rhonchi, wheezing, or rubs  HEART: Regular rate and rhythm; No murmurs, rubs, or gallops  ABDOMEN: Soft, Nontender, Nondistended; Bowel sounds present  EXTREMITIES:  2+ Peripheral Pulses, No clubbing, cyanosis, or edema  LYMPH: No lymphadenopathy noted  SKIN: No rashes or lesions    LABS:                        8.1    14.01 )-----------( 78       ( 05 Dec 2018 15:32 )             24.7     12-05    128<L>  |  89<L>  |  52<H>  ----------------------------<  142<H>  4.4   |  19  |  4.3<HH>    Ca    7.8<L>      05 Dec 2018 08:00  Phos  6.9     12-05  Mg     1.9     12-05    TPro  7.1  /  Alb  2.8<L>  /  TBili  4.9<H>  /  DBili  3.1<H>  /  AST  198<H>  /  ALT  63<H>  /  AlkPhos  73  12-05    PT/INR - ( 05 Dec 2018 15:32 )   PT: 28.90 sec;   INR: 2.62 ratio         PTT - ( 05 Dec 2018 15:32 )  PTT:46.9 sec    CAPILLARY BLOOD GLUCOSE      POCT Blood Glucose.: 152 mg/dL (05 Dec 2018 18:31)      RADIOLOGY & ADDITIONAL TESTS:    Imaging Personally Reviewed:  [ ] YES  [ ] NO    Consultant(s) Notes Reviewed:  [ ] YES  [ ] NO    Care Discussed with Consultants/Other Providers [ ] YES  [ ] NO Patient is seen and examined at the bed side, is afebrile.      REVIEW OF SYSTEMS: Unable to obtain due to mental status      Vital Signs Last 24 Hrs  T(C): 35.3 (05 Dec 2018 17:00), Max: 36.6 (05 Dec 2018 09:04)  T(F): 95.5 (05 Dec 2018 17:00), Max: 97.9 (05 Dec 2018 09:04)  HR: 100 (05 Dec 2018 19:00) (90 - 115)  BP: 133/64 (05 Dec 2018 19:00) (100/62 - 154/74)  BP(mean): 91 (05 Dec 2018 19:00) (74 - 106)  RR: 19 (05 Dec 2018 19:00) (16 - 118)  SpO2: 100% (05 Dec 2018 19:00) (94% - 100%)        PHYSICAL EXAM:  GENERAL:  Morbidly obese male lying on bed and Mild restless  CHEST/LUNG: Air entry  bilaterally  HEART: s1 and s2 present  ABDOMEN: very distended   EXTREMITIES:   edematous  CNA: Awake and confused        MEDICATIONS  (STANDING):  ALBUTerol/ipratropium for Nebulization 3 milliLiter(s) Nebulizer every 6 hours  cefTRIAXone   IVPB 1 Gram(s) IV Intermittent every 12 hours  folic acid 1 milliGRAM(s) Oral daily  furosemide   Injectable 80 milliGRAM(s) IV Push every 8 hours  hydrocortisone sodium succinate Injectable 100 milliGRAM(s) IV Push every 8 hours  lactulose Syrup 20 Gram(s) Oral every 2 hours  pantoprazole    Tablet 40 milliGRAM(s) Oral before breakfast  rifaximin 550 milliGRAM(s) Oral two times a day  thiamine 100 milliGRAM(s) Oral daily    MEDICATIONS  (PRN):  dextrose 50% Injectable 50 milliLiter(s) IV Push every 2 hours PRN if fingerstick <70      Allergies    No Known Allergies              LABS:                        8.1    14.01 )-----------( 78       ( 05 Dec 2018 15:32 )             24.7         12-05    128<L>  |  89<L>  |  52<H>  ----------------------------<  142<H>  4.4   |  19  |  4.3<HH>    Ca    7.8<L>      05 Dec 2018 08:00  Phos  6.9     12-05  Mg     1.9     12-05    TPro  7.1  /  Alb  2.8<L>  /  TBili  4.9<H>  /  DBili  3.1<H>  /  AST  198<H>  /  ALT  63<H>  /  AlkPhos  73  12-05    PT/INR - ( 05 Dec 2018 15:32 )   PT: 28.90 sec;   INR: 2.62 ratio         PTT - ( 05 Dec 2018 15:32 )  PTT:46.9 sec    CAPILLARY BLOOD GLUCOSE      POCT Blood Glucose.: 152 mg/dL (05 Dec 2018 18:31)      RADIOLOGY & ADDITIONAL TESTS:    < from: Xray Chest 1 View- PORTABLE-Urgent (12.05.18 @ 01:42) >  New right lung base opacity.    Stable interstitial opacities.    No pneumothorax.      < end of copied text >      < from: US Urinary Bladder (12.04.18 @ 16:46) >  Examination is severely limited by portable technique and patient's body   habitus. Urinary bladder is not definitely visualized.    < end of copied text >      < from: CT Abdomen and Pelvis No Cont (12.02.18 @ 17:35) >  Massive ascites throughout the abdomen and pelvis.    Hepatic cirrhosis.      < end of copied text >      MICROBIOLOGY DATA:    Culture - Blood in AM (12.04.18 @ 06:23)    Specimen Source: .Blood None    Culture Results:   No growth to date.      Culture - Body Fluid with Gram Stain (12.03.18 @ 14:19)    Gram Stain:   polymorphonuclear leukocytes seen per low power field  No organisms seen per oil power field  by cytocentrifuge    Specimen Source: .Body Fluid Peritoneal Fluid    Culture Results:   No growth to date.      Culture - Blood (12.02.18 @ 12:24)    -  Streptococcus sp. (Not Grp A, B or S pneumoniae): Detec    Gram Stain:   Growth in anaerobic bottle: Gram Positive Cocci in Pairs and Chains    Specimen Source: .Blood Blood    Organism: Blood Culture PCR    Culture Results:   Growth in anaerobic bottle: Streptococcus anginosus  "Due to technical problems, Proteus sp. will Not be reported as part of  the BCID panel until further notice"  ***Blood Panel PCR results on this specimen are available  approximately 3 hours after the Gram stain result.***  Gram stain, PCR, and/or culture results may not always  correspond due to difference in methodologies.  ************************************************************  This PCR assay was performed using [employer].  The following targets are tested for: Enterococcus,  vancomycin resistant enterococci, Listeria monocytogenes,  coagulase negative staphylococci, S. aureus,  methicillin resistant S. aureus, Streptococcus agalactiae  (Group B), S. pneumoniae, S. pyogenes (Group A),  Acinetobacter baumannii, Enterobacter cloacae, E. coli,  Klebsiella oxytoca, K. pneumoniae, Proteus sp.,  Serratia marcescens, Haemophilus influenzae,  Neisseria meningitidis, Pseudomonas aeruginosa, Candida  albicans, C. glabrata, C krusei, C parapsilosis,  C. tropicalis and the KPC resistance gene.    Organism Identification: Blood Culture PCR    Method Type: PCR

## 2018-12-05 NOTE — DISCHARGE NOTE ADULT - MEDICATION SUMMARY - MEDICATIONS TO STOP TAKING
I will STOP taking the medications listed below when I get home from the hospital:    FUROSEMIDE   TAB 40MG    SPIRONOLACT  TAB 50MG    PANTOPRAZOLE TAB 40MG    OXYCODONE    TAB 30MG

## 2018-12-05 NOTE — PROGRESS NOTE ADULT - REASON FOR ADMISSION
Altered Mental Status

## 2018-12-05 NOTE — PROGRESS NOTE ADULT - SUBJECTIVE AND OBJECTIVE BOX
NEPHROLOGY PROGRESS NOTE    pt seen and examined in icu  critically ill  unable to place zavala  hd catheter placed then dc'ed  uop better > 1L last 24 hours  cr and na slightly better    PAST MEDICAL & SURGICAL HISTORY:    Allergies:  No Known Allergies    Home Medications Reviewed    SOCIAL HISTORY:  Denies ETOH,Smoking,   FAMILY HISTORY:        REVIEW OF SYSTEMS:  unobtainable    PHYSICAL EXAM:  sedated  jaundiced  obese  cta b/l  rrr  distention  + anasarca  groin TLC  no [facility] Medications:   MEDICATIONS  (STANDING):  ALBUTerol/ipratropium for Nebulization 3 milliLiter(s) Nebulizer every 6 hours  cefTRIAXone   IVPB 2 Gram(s) IV Intermittent every 12 hours  folic acid 1 milliGRAM(s) Oral daily  furosemide   Injectable 80 milliGRAM(s) IV Push every 8 hours  hydrocortisone sodium succinate Injectable 100 milliGRAM(s) IV Push every 8 hours  lactulose Syrup 20 Gram(s) Oral every 2 hours  pantoprazole    Tablet 40 milliGRAM(s) Oral before breakfast  rifaximin 550 milliGRAM(s) Oral two times a day  thiamine 100 milliGRAM(s) Oral daily        VITALS:  T(F): 96 (12-05-18 @ 03:04), Max: 96.5 (12-04-18 @ 21:00)  HR: 101 (12-05-18 @ 08:07)  BP: 128/73 (12-05-18 @ 08:07)  RR: 19 (12-05-18 @ 08:07)  SpO2: 98% (12-05-18 @ 08:07)  Wt(kg): --    12-03 @ 07:01  -  12-04 @ 07:00  --------------------------------------------------------  IN: 990 mL / OUT: 0 mL / NET: 990 mL    12-04 @ 07:01  -  12-05 @ 07:00  --------------------------------------------------------  IN: 1780 mL / OUT: 620 mL / NET: 1160 mL          LABS:  12-05    125<L>  |  88<L>  |  51<H>  ----------------------------<  152<H>  4.4   |  20  |  4.3<HH>    Ca    7.3<L>      05 Dec 2018 03:30    TPro  7.1  /  Alb  2.8<L>  /  TBili  4.9<H>  /  DBili  3.1<H>  /  AST  198<H>  /  ALT  63<H>  /  AlkPhos  73  12-05                          6.9    10.67 )-----------( 52       ( 05 Dec 2018 03:30 )             21.1       Urine Studies:    Chloride, Random Urine: <20 (12-04 @ 11:00)  Creatinine, Random Urine: 198 mg/dL (12-04 @ 11:00)  Sodium, Random Urine: <20.0 mmoL/L (12-04 @ 11:00)  Osmolality, Random Urine: 292 mos/kg (12-04 @ 11:00)  Potassium, Random Urine: 59 mmol/L (12-04 @ 11:00)      RADIOLOGY & ADDITIONAL STUDIES:

## 2018-12-05 NOTE — PROGRESS NOTE ADULT - SUBJECTIVE AND OBJECTIVE BOX
CURRENT COMPLAINTS:  59 yo M with HCV cirrhosis admitted with change in mental status and massive ascites, SBP and everted umbilicus.  He has severe renal insufficiency and nephrology is following.  He denies abdominal pain, nausea or vomiting.  Pt has note had hematemesis, hematochezia or melena but by the nurse's/HO report he pulled out his femoral line and there was prolonged bleeding at its insertion site.    PAST MEDICAL & SURGICAL HISTORY:  Heroin abuse  Hepatitis C virus infection without hepatic coma, unspecified chronicity  ETOH abuse  Alcoholic cirrhosis of liver with ascites  No significant past surgical history      ALLERGIES  No Known Allergies      MEDICATIONS  ALBUTerol/ipratropium for Nebulization 3 milliLiter(s) Nebulizer every 6 hours  cefTRIAXone   IVPB 1 Gram(s) IV Intermittent every 12 hours  dextrose 50% Injectable 50 milliLiter(s) IV Push every 2 hours PRN  folic acid 1 milliGRAM(s) Oral daily  furosemide   Injectable 80 milliGRAM(s) IV Push every 8 hours  hydrocortisone sodium succinate Injectable 100 milliGRAM(s) IV Push every 8 hours  lactulose Syrup 20 Gram(s) Oral every 2 hours  pantoprazole    Tablet 40 milliGRAM(s) Oral before breakfast  rifaximin 550 milliGRAM(s) Oral two times a day  thiamine 100 milliGRAM(s) Oral daily      Physical Exam:  Vital Signs Last 24 Hrs  T(C): 36.6 (05 Dec 2018 11:07), Max: 36.6 (05 Dec 2018 09:04)  T(F): 97.8 (05 Dec 2018 11:07), Max: 97.9 (05 Dec 2018 09:04)  HR: 103 (05 Dec 2018 11:08) (89 - 110)  BP: 144/66 (05 Dec 2018 11:08) (100/62 - 144/66)  BP(mean): 93 (05 Dec 2018 11:08) (74 - 98)  RR: 16 (05 Dec 2018 11:08) (12 - 118)  SpO2: 100% (05 Dec 2018 11:08) (96% - 100%)  HEENT:          Anicteric, neck supple, no JVD.  Chest:            No rales, rhonchi or wheezes.  Full breath sounds.  Cardiac:         RRR No S3/S4  Abdomen:     Soft, non-tender, tense abdomen due to massive ascites, everted umbilicus (not tense, not ulcerated), normoactive bowel sounds.  Extremities:  No edema  Neurologic:   Somnolent, oriented only x 1    Laboratories:    CBC Full  -  ( 05 Dec 2018 08:00 )  WBC Count : 11.42 K/uL  Hemoglobin : 7.9 g/dL  Hematocrit : 24.1 %  Platelet Count - Automated : 57 K/uL  Mean Cell Volume : 95.6 fL  Mean Cell Hemoglobin : 31.3 pg  Mean Cell Hemoglobin Concentration : 32.8 g/dL  Auto Neutrophil # : x  Auto Lymphocyte # : x  Auto Monocyte # : x  Auto Eosinophil # : x  Auto Basophil # : x  Auto Neutrophil % : x  Auto Lymphocyte % : x  Auto Monocyte % : x  Auto Eosinophil % : x  Auto Basophil % : x    12-05    128<L>  |  89<L>  |  52<H>  ----------------------------<  142<H>  4.4   |  19  |  4.3<HH>    Ca    7.8<L>      05 Dec 2018 08:00  Phos  6.9     12-05  Mg     1.9     12-05    TPro  7.1  /  Alb  2.8<L>  /  TBili  4.9<H>  /  DBili  3.1<H>  /  AST  198<H>  /  ALT  63<H>  /  AlkPhos  73  12-05    PT/INR - ( 05 Dec 2018 08:00 )   PT: 26.80 sec;   INR: 2.44 ratio         PTT - ( 05 Dec 2018 08:00 )  PTT:44.9 sec      Radiologic Imaging:

## 2018-12-05 NOTE — PROGRESS NOTE ADULT - SUBJECTIVE AND OBJECTIVE BOX
Patient is a 58y old  Male who presents with a chief complaint of Altered Mental Status (05 Dec 2018 08:48)      Over Night Events:  Patient seen and examined lethargic on and off he pulled the femoral line yesterday , intesivist try to place another line not able so he switch the UDULl to central line       ROS:  See HPI    PHYSICAL EXAM    ICU Vital Signs Last 24 Hrs  T(C): 35.6 (05 Dec 2018 03:04), Max: 35.8 (04 Dec 2018 21:00)  T(F): 96 (05 Dec 2018 03:04), Max: 96.5 (04 Dec 2018 21:00)  HR: 101 (05 Dec 2018 08:07) (89 - 103)  BP: 128/73 (05 Dec 2018 08:07) (107/54 - 139/78)  BP(mean): 93 (05 Dec 2018 08:07) (74 - 98)  ABP: --  ABP(mean): --  RR: 19 (05 Dec 2018 08:07) (12 - 118)  SpO2: 98% (05 Dec 2018 08:07) (96% - 100%)      General: Awake   HEENT: pale                Lymph Nodes: NO cervical LN   Lungs: Bilateral BS  Cardiovascular: Regular   Abdomen: Soft, Positive BS ascitics   Extremities: No clubbing 2 edema   Skin: warm   Neurological: lethargic   Musculoskeletal: move all ext     I&O's Detail    04 Dec 2018 07:01  -  05 Dec 2018 07:00  --------------------------------------------------------  IN:    Albumin 5% - 50 mL: 500 mL    IV PiggyBack: 100 mL    Packed Red Blood Cells: 280 mL    Plasma: 900 mL  Total IN: 1780 mL    OUT:    Voided: 620 mL  Total OUT: 620 mL    Total NET: 1160 mL          LABS:                          6.9    10.67 )-----------( 52       ( 05 Dec 2018 03:30 )             21.1         05 Dec 2018 03:30    125    |  88     |  51     ----------------------------<  152    4.4     |  20     |  4.3      Ca    7.3        05 Dec 2018 03:30    TPro  7.1    /  Alb  2.8    /  TBili  4.9    /  DBili  3.1    /  AST  198    /  ALT  63     /  AlkPhos  73     05 Dec 2018 03:30  Amylase x     lipase x                                                 PT/INR - ( 05 Dec 2018 03:30 )   PT: 27.80 sec;   INR: 2.53 ratio         PTT - ( 05 Dec 2018 03:30 )  PTT:45.7 sec                                             CARDIAC MARKERS ( 04 Dec 2018 06:23 )  x     / x     / 3595 U/L / x     / x                                                            Culture - Body Fluid with Gram Stain (collected 03 Dec 2018 14:19)  Source: .Body Fluid Peritoneal Fluid  Gram Stain (04 Dec 2018 02:26):    polymorphonuclear leukocytes seen per low power field    No organisms seen per oil power field    by cytocentrifuge  Preliminary Report (04 Dec 2018 21:46):    No growth to date.    Culture - Blood (collected 02 Dec 2018 12:24)  Source: .Blood Blood  Gram Stain (03 Dec 2018 19:18):    Growth in anaerobic bottle: Gram Positive Cocci in Pairs and Chains  Preliminary Report (04 Dec 2018 19:33):    Growth in anaerobic bottle: Streptococcus anginosus    "Due to technical problems, Proteus sp. will Not be reported as part of    the BCID panel until further notice"    ***Blood Panel PCR results on this specimen are available    approximately 3 hours after the Gram stain result.***    Gram stain, PCR, and/or culture results may not always    correspond due to difference in methodologies.    ************************************************************    This PCR assay was performed using [employer].    The following targets are tested for: Enterococcus,    vancomycin resistant enterococci, Listeria monocytogenes,    coagulase negative staphylococci, S. aureus,    methicillin resistant S. aureus, Streptococcus agalactiae    (Group B), S. pneumoniae, S. pyogenes (Group A),    Acinetobacter baumannii, Enterobacter cloacae, E. coli,    Klebsiella oxytoca, K. pneumoniae, Proteus sp.,    Serratia marcescens, Haemophilus influenzae,    Neisseria meningitidis, Pseudomonas aeruginosa, Candida    albicans, C. glabrata, C krusei, C parapsilosis,    C. tropicalis and the KPC resistance gene.  Organism: Blood Culture PCR (03 Dec 2018 20:55)  Organism: Blood Culture PCR (03 Dec 2018 20:55)    Culture - Blood (collected 02 Dec 2018 12:24)  Source: .Blood Blood  Gram Stain (05 Dec 2018 02:58):    Growth in anaerobic bottle: Gram Positive Cocci in Pairs and Chains    Growth in aerobic bottle: Gram Positive Cocci in Pairs and Chains  Preliminary Report (05 Dec 2018 02:58):    Growth in anaerobic bottle: Streptococcus anginosus    Growth in aerobic bottle: Gram Positive Cocci in Pairs and Chains                                                                                           MEDICATIONS  (STANDING):  ALBUTerol/ipratropium for Nebulization 3 milliLiter(s) Nebulizer every 6 hours  cefTRIAXone   IVPB 2 Gram(s) IV Intermittent every 12 hours  folic acid 1 milliGRAM(s) Oral daily  furosemide   Injectable 80 milliGRAM(s) IV Push every 8 hours  hydrocortisone sodium succinate Injectable 100 milliGRAM(s) IV Push every 8 hours  lactulose Syrup 20 Gram(s) Oral every 2 hours  pantoprazole    Tablet 40 milliGRAM(s) Oral before breakfast  rifaximin 550 milliGRAM(s) Oral two times a day  thiamine 100 milliGRAM(s) Oral daily    MEDICATIONS  (PRN):  dextrose 50% Injectable 50 milliLiter(s) IV Push every 2 hours PRN if fingerstick <70          Xrays:  TLC:  OG:  ET tube:                                                                                    bibasilar opacity stable    ECHO:

## 2018-12-05 NOTE — DISCHARGE NOTE ADULT - PATIENT PORTAL LINK FT
You can access the [facility] Portal, offered by [facility], by registering with the following website: [email or web address]

## 2018-12-05 NOTE — DISCHARGE NOTE ADULT - MEDICATION SUMMARY - MEDICATIONS TO TAKE
I will START or STAY ON the medications listed below when I get home from the hospital:    ipratropium-albuterol 0.5 mg-2.5 mg/3 mLinhalation solution  -- 3 milliliter(s) inhaled every 6 hours  -- Indication: For Alcoholic cirrhosis of liver with ascites    cefTRIAXone 1 g intravenous injection  -- 1 gram(s) intravenous every 12 hours  -- Indication: For Alcoholic cirrhosis of liver with ascites    furosemide 100 mg/100 mL-0.9% intravenous solution  -- 80 milliliter(s) intravenous every 8 hours  -- Indication: For Anasarca    vancomycin 1 g intravenous injection  -- 1 gram(s) intravenous every 48 hours  -- Indication: For Altered mental status    lactulose 10 g/15 mL oral syrup  -- 30 milliliter(s) by mouth every 2 hours  -- Indication: For Altered mental status    rifAXIMin 550 mg oral tablet  -- 1 tab(s) by mouth 2 times a day  -- Indication: For Altered mental status    PANTOPRAZOLE TAB 40MG  -- Indication: For Alcoholic cirrhosis of liver with ascites    DAILY-ALICJA   TAB  -- Indication: For Alcoholic cirrhosis of liver with ascites    folic acid 1 mg oral tablet  -- 1 tab(s) by mouth once a day  -- Indication: For Alcoholic cirrhosis of liver with ascites    thiamine 100 mg oral tablet  -- 1 tab(s) by mouth once a day  -- Indication: For Alcoholic cirrhosis of liver with ascites

## 2018-12-05 NOTE — CHART NOTE - NSCHARTNOTEFT_GEN_A_CORE
pt pulled out right femoral central line.  Intensivist & I may several attempts to replace line in right internal jugular , right subclavin, right femoral without success. Pt needs IV antibiotics tonight. Therefore decision to remove left femoral dialysis cath and insert left femoral TLC was made . as infusion via dialysis cath is against hospital policy. Left TLC was done via selindger technique.  Pt tolerated procedure well,  however insertion site os oozing heavily. pressure dressing placed and FFP to be given stat. pt pulled out right femoral central line.  Intensivist & I may several attempts to replace line in right internal jugular , right subclavin, right femoral without success. Pt needs IV antibiotics tonight. Therefore decision to remove left femoral dialysis cath and insert left femoral TLC was made . as infusion via dialysis cath is against hospital policy. Left TLC was done via selindger technique.  Pt tolerated procedure well,  however insertion site is oozing heavily. pressure dressing placed and FFP & prbc to be given stat.

## 2018-12-06 VITALS — RESPIRATION RATE: 20 BRPM

## 2018-12-06 LAB
CULTURE RESULTS: SIGNIFICANT CHANGE UP
GLUCOSE BLDC GLUCOMTR-MCNC: 141 MG/DL — HIGH (ref 70–99)
HCV RNA FLD QL NAA+PROBE: SIGNIFICANT CHANGE UP
HCV RNA SPEC QL PROBE+SIG AMP: DETECTED
ORGANISM # SPEC MICROSCOPIC CNT: SIGNIFICANT CHANGE UP
SPECIMEN SOURCE: SIGNIFICANT CHANGE UP

## 2018-12-06 RX ADMIN — LACTULOSE 20 GRAM(S): 10 SOLUTION ORAL at 01:30

## 2018-12-08 LAB
CULTURE RESULTS: NO GROWTH — SIGNIFICANT CHANGE UP
SPECIMEN SOURCE: SIGNIFICANT CHANGE UP

## 2018-12-09 LAB
CULTURE RESULTS: SIGNIFICANT CHANGE UP
SPECIMEN SOURCE: SIGNIFICANT CHANGE UP

## 2018-12-10 DIAGNOSIS — R60.9 EDEMA, UNSPECIFIED: ICD-10-CM

## 2018-12-10 DIAGNOSIS — M62.82 RHABDOMYOLYSIS: ICD-10-CM

## 2018-12-10 DIAGNOSIS — N17.9 ACUTE KIDNEY FAILURE, UNSPECIFIED: ICD-10-CM

## 2018-12-10 DIAGNOSIS — E87.1 HYPO-OSMOLALITY AND HYPONATREMIA: ICD-10-CM

## 2018-12-10 DIAGNOSIS — D69.6 THROMBOCYTOPENIA, UNSPECIFIED: ICD-10-CM

## 2018-12-10 DIAGNOSIS — I95.9 HYPOTENSION, UNSPECIFIED: ICD-10-CM

## 2018-12-10 DIAGNOSIS — E66.01 MORBID (SEVERE) OBESITY DUE TO EXCESS CALORIES: ICD-10-CM

## 2018-12-10 DIAGNOSIS — K65.2 SPONTANEOUS BACTERIAL PERITONITIS: ICD-10-CM

## 2018-12-10 DIAGNOSIS — F17.210 NICOTINE DEPENDENCE, CIGARETTES, UNCOMPLICATED: ICD-10-CM

## 2018-12-10 DIAGNOSIS — E16.2 HYPOGLYCEMIA, UNSPECIFIED: ICD-10-CM

## 2018-12-10 DIAGNOSIS — E51.9 THIAMINE DEFICIENCY, UNSPECIFIED: ICD-10-CM

## 2018-12-10 DIAGNOSIS — N35.911 UNSPECIFIED URETHRAL STRICTURE, MALE, MEATAL: ICD-10-CM

## 2018-12-10 DIAGNOSIS — K72.90 HEPATIC FAILURE, UNSPECIFIED WITHOUT COMA: ICD-10-CM

## 2018-12-10 DIAGNOSIS — M19.90 UNSPECIFIED OSTEOARTHRITIS, UNSPECIFIED SITE: ICD-10-CM

## 2018-12-10 DIAGNOSIS — K76.7 HEPATORENAL SYNDROME: ICD-10-CM

## 2018-12-10 DIAGNOSIS — G89.29 OTHER CHRONIC PAIN: ICD-10-CM

## 2018-12-10 DIAGNOSIS — M54.9 DORSALGIA, UNSPECIFIED: ICD-10-CM

## 2018-12-10 DIAGNOSIS — R79.1 ABNORMAL COAGULATION PROFILE: ICD-10-CM

## 2018-12-10 DIAGNOSIS — R45.1 RESTLESSNESS AND AGITATION: ICD-10-CM

## 2018-12-10 DIAGNOSIS — E87.5 HYPERKALEMIA: ICD-10-CM

## 2018-12-10 DIAGNOSIS — Z78.1 PHYSICAL RESTRAINT STATUS: ICD-10-CM
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
## 2018-12-10 DIAGNOSIS — B19.20 UNSPECIFIED VIRAL HEPATITIS C WITHOUT HEPATIC COMA: ICD-10-CM

## 2018-12-10 DIAGNOSIS — K70.31 ALCOHOLIC CIRRHOSIS OF LIVER WITH ASCITES: ICD-10-CM

## 2018-12-10 DIAGNOSIS — A40.8 OTHER STREPTOCOCCAL SEPSIS: ICD-10-CM

## 2018-12-10 DIAGNOSIS — F11.10 OPIOID ABUSE, UNCOMPLICATED: ICD-10-CM

## 2018-12-10 DIAGNOSIS — E87.2 ACIDOSIS: ICD-10-CM

## 2018-12-10 DIAGNOSIS — R65.21 SEVERE SEPSIS WITH SEPTIC SHOCK: ICD-10-CM

## 2018-12-10 DIAGNOSIS — F10.10 ALCOHOL ABUSE, UNCOMPLICATED: ICD-10-CM

## 2018-12-10 DIAGNOSIS — R41.82 ALTERED MENTAL STATUS, UNSPECIFIED: ICD-10-CM

## 2018-12-10 LAB — DRUG SCREEN, SERUM: SIGNIFICANT CHANGE UP
